# Patient Record
Sex: FEMALE | Race: WHITE | URBAN - METROPOLITAN AREA
[De-identification: names, ages, dates, MRNs, and addresses within clinical notes are randomized per-mention and may not be internally consistent; named-entity substitution may affect disease eponyms.]

---

## 2018-08-06 ENCOUNTER — OFFICE VISIT (OUTPATIENT)
Dept: INTERNAL MEDICINE CLINIC | Age: 18
End: 2018-08-06

## 2018-08-06 ENCOUNTER — HOSPITAL ENCOUNTER (OUTPATIENT)
Dept: LAB | Age: 18
Discharge: HOME | End: 2018-08-06
Payer: COMMERCIAL

## 2018-08-06 VITALS
HEART RATE: 70 BPM | BODY MASS INDEX: 20.28 KG/M2 | DIASTOLIC BLOOD PRESSURE: 62 MMHG | SYSTOLIC BLOOD PRESSURE: 102 MMHG | WEIGHT: 126.2 LBS | TEMPERATURE: 99.3 F | HEIGHT: 66 IN | OXYGEN SATURATION: 99 % | RESPIRATION RATE: 16 BRPM

## 2018-08-06 DIAGNOSIS — Z01.419 WELL FEMALE EXAM WITH ROUTINE GYNECOLOGICAL EXAM: ICD-10-CM

## 2018-08-06 DIAGNOSIS — Z00.00 PHYSICAL EXAM: Primary | ICD-10-CM

## 2018-08-06 PROCEDURE — 88175 CYTOPATH C/V AUTO FLUID REDO: CPT | Performed by: INTERNAL MEDICINE

## 2018-08-06 RX ORDER — NORGESTIMATE AND ETHINYL ESTRADIOL 7DAYSX3 28
KIT ORAL
COMMUNITY
End: 2018-11-20 | Stop reason: SDUPTHER

## 2018-08-06 NOTE — MR AVS SNAPSHOT
[address] [facility] [address] 
[phone] Patient: Farida Number MRN: [id number] [place] Meter Visit Information Date & Time Provider Department Dept. Phone Encounter #  
 8/6/2018  1:00 PM Jimmy Pulido, [address] [facility],[address] Internal Medicine [phone] [id number] Upcoming Health Maintenance Date Due Hepatitis A Peds Age 1-18 (1 of 2 - Standard Series) 8/18/1999 DTaP/Tdap/Td series (1 - Tdap) 8/18/2005 HPV Age 9Y-34Y (1 of 3 - Female 3 Dose Series) 8/18/2009 Influenza Age 5 to Adult 8/1/2018 Allergies as of 8/6/2018  Review Complete On: 8/6/2018 By: Jimmy Pulido MD  
 No Known Allergies Current Immunizations  Never Reviewed No immunizations on file. Not reviewed this visit You Were Diagnosed With   
  
 Codes Comments Physical exam    -  Primary ICD-10-CM: Z00.00 ICD-9-CM: V70.9 Well female exam with routine gynecological exam     ICD-10-CM: I96.430 ICD-9-CM: V72.31 Vitals BP Pulse Temp Resp Height(growth percentile) 102/62 (24 %/ 46 %)* (BP 1 Location: Left arm, BP Patient Position: Sitting) 70 99.3 °F (37.4 °C) (Oral) 16 5' 5.5\" (1.664 m) (68 %, Z= 0.47) Weight(growth percentile) LMP SpO2 BMI Smoking Status 126 lb 3.2 oz (57.2 kg) (46 %, Z= -0.10) 07/16/2018 99% 20.68 kg/m2 (37 %, Z= -0.34) Never Smoker *BP percentiles are based on NHBPEP's 4th Report Growth percentiles are based on CDC 2-20 Years data. BMI and BSA Data Body Mass Index Body Surface Area  
 20.68 kg/m 2 1.63 m 2 Preferred Pharmacy Pharmacy Name Phone [id number] Flynn [phone] Your Updated Medication List  
  
   
This list is accurate as of 8/6/18  2:12 PM.  Always use your most recent med list.  
  
  
  
  
 norgestimate-ethinyl estradiol 0.18/0.215/0.25 mg-35 mcg (28) Tab Commonly known as:  ORTHO TRI-CYCLEN, TRI-SPRINTEC Take  by mouth. We Performed the Following CBC W/O DIFF [39944 CPT(R)] METABOLIC PANEL, COMPREHENSIVE [24790 CPT(R)] PAP IG, [address] HPV ASCU, [id number]) [[id number] Custom] TSH 3RD GENERATION [48872 CPT(R)] Introducing [facility]! Joseph Win introduces Okta patient portal. Now you can access parts of your medical record, email your doctor's office, and request medication refills online. 1. In your internet browser, go to [email or web address] 2. Click on the First Time User? Click Here link in the Sign In box. You will see the New Member Sign Up page. 3. Enter your Okta Access Code exactly as it appears below. You will not need to use this code after youve completed the sign-up process. If you do not sign up before the expiration date, you must request a new code. · Okta Access Code: [email or web address] Expires: 11/4/2018  2:12 PM 
 
4. Enter the last four digits of your Social Security Number (xxxx) and Date of Birth (mm/dd/yyyy) as indicated and click Submit. You will be taken to the next sign-up page. 5. Create a Okta ID. This will be your Okta login ID and cannot be changed, so think of one that is secure and easy to remember. 6. Create a Okta password. You can change your password at any time. 7. Enter your Password Reset Question and Answer. This can be used at a later time if you forget your password. 8. Enter your e-mail address. You will receive e-mail notification when new information is available in [address] 9. Click Sign Up. You can now view and download portions of your medical record. 10. Click the Download Summary menu link to download a portable copy of your medical information. If you have questions, please visit the Frequently Asked Questions section of the Okta website.  Remember, Okta is NOT to be used for urgent needs. For medical emergencies, dial 911. Now available from your iPhone and Android! Please provide this summary of care documentation to your next provider. Your primary care clinician is listed as Craig Gunn. If you have any questions after today's visit, please call [phone].

## 2018-08-06 NOTE — PROGRESS NOTES
Chief Complaint   Patient presents with   [facility] Establish Care     Visit Vitals    /62 (BP 1 Location: Left arm, BP Patient Position: Sitting)    Pulse 70    Temp 99.3 °F (37.4 °C) (Oral)    Resp 16    Ht 5' 5.5\" (1.664 m)    Wt 126 lb 3.2 oz (57.2 kg)    SpO2 99%    BMI 20.68 kg/m2     1. Have you been to the ER, urgent care clinic since your last visit? Hospitalized since your last visit? No    2. Have you seen or consulted any other health care providers outside of the [address] [place] [address] since your last visit? Include any pap smears or colon screening.  No

## 2018-08-06 NOTE — PROGRESS NOTES
Written by Craig Anders, as dictated by Dr. Cal Spurling, MD.    Lynn Collazo is a 21 y.o. female. HPI  The patient comes in today to establish care before she goes back to school at [employer]. She would like a pap smear today but she is not fasting for labs. She denies constipation, heartburn, vaginal discharge,     Patient notes that she has pollen allergies, which were especially bad this year. She can usually take Zyrtec with relief. She denies allergies at this time. She notes that she gets cold sores occasionally, which first started when she was a child. She takes oral contraceptives which were last prescribed by a health care provider at [facility]. She has been taking oral contraceptives since she was 14 because her cycles was irregular. Her cycle is regular while she is on oral contraceptives. She is sexually active and uses condoms, but has not had a pap smear. Patient reports that she has been sexually active since she was 14. When she was younger she received all three HPV vaccines. She does not check her breasts regularly and she denies fhx of breast cancer. Patient notes that she takes a women's multivitamin. She was out in the sun a lot this summer as she was a . No current outpatient prescriptions on file prior to visit. No current facility-administered medications on file prior to visit. Past Medical History:   Diagnosis Date    History of IBS        Past Surgical History:   Procedure Laterality Date    TX RF TONGUE BASE VOL REDUXN      Age 10       Social History     Social History    Marital status: UNKNOWN     Spouse name: N/A    Number of children: N/A    Years of education: N/A     Occupational History    Not on file.      Social History Main Topics    Smoking status: Never Smoker    Smokeless tobacco: Never Used    Alcohol use No    Drug use: No    Sexual activity: Yes     Birth control/ protection: Pill Other Topics Concern    Not on file     Social History Narrative    No narrative on file       Review of Systems   Constitutional: Negative for malaise/fatigue. HENT: Negative for congestion. Eyes: Negative for blurred vision and pain. Respiratory: Negative for cough and shortness of breath. Cardiovascular: Negative for chest pain and palpitations. Gastrointestinal: Negative for abdominal pain and heartburn. Genitourinary: Negative for frequency and urgency. Musculoskeletal: Negative for joint pain and myalgias. Neurological: Negative for dizziness, tingling, sensory change, weakness and headaches. Endo/Heme/Allergies: Positive for environmental allergies. Psychiatric/Behavioral: Negative for depression, memory loss and substance abuse. Visit Vitals    /62 (BP 1 Location: Left arm, BP Patient Position: Sitting)    Pulse 70    Temp 99.3 °F (37.4 °C) (Oral)    Resp 16    Ht 5' 5.5\" (1.664 m)    Wt 126 lb 3.2 oz (57.2 kg)    LMP 07/16/2018    SpO2 99%    BMI 20.68 kg/m2       Physical Exam   Constitutional: She is oriented to person, place, and time. She appears well-developed and well-nourished. No distress. HENT:   Right Ear: External ear normal.   Left Ear: External ear normal.   Eyes: Conjunctivae and EOM are normal. Right eye exhibits no discharge. Left eye exhibits no discharge. Neck: Normal range of motion. Neck supple. Cardiovascular: Normal rate and regular rhythm. Pulses:       Dorsalis pedis pulses are 2+ on the right side, and 2+ on the left side. Pulmonary/Chest: Effort normal and breath sounds normal. She has no wheezes. Breast exam normal , no masses palpable. No axillary lymphadenopathy. Abdominal: Soft. Bowel sounds are normal. There is no tenderness. Genitourinary: Vaginal discharge found. Genitourinary Comments: White discharge   Lymphadenopathy:     She has no cervical adenopathy.    Neurological: She is alert and oriented to person, place, and time. Reflex Scores:       Patellar reflexes are 2+ on the right side and 2+ on the left side. Skin: She is not diaphoretic. Psychiatric: She has a normal mood and affect. Her behavior is normal.   Nursing note and vitals reviewed. ASSESSMENT and PLAN    ICD-10-CM ICD-9-CM    1. Physical exam Z00.00 V70.9 CBC W/O DIFF      METABOLIC PANEL, COMPREHENSIVE      TSH 3RD GENERATION    Complete physical exam done. Basic labs drawn. 2. Well female exam with routine gynecological exam Z01.419 V72.31 PAP IG, RFX HPV ASCU, 34&73,62([id number])    Pap smear performed in office, patient tolerated well. Breast exam done as well. Counseling given on protected sex. This plan was reviewed with the patient and patient agrees. All questions were answered. This scribe documentation was reviewed by me and accurately reflects the examination and decisions made by me.

## 2018-08-07 LAB
ALBUMIN SERPL-MCNC: 4.8 G/DL (ref 3.5–5.5)
ALBUMIN/GLOB SERPL: 1.7 {RATIO} (ref 1.2–2.2)
ALP SERPL-CCNC: 44 IU/L (ref 39–117)
ALT SERPL-CCNC: 12 IU/L (ref 0–32)
AST SERPL-CCNC: 19 IU/L (ref 0–40)
BILIRUB SERPL-MCNC: 0.3 MG/DL (ref 0–1.2)
BUN SERPL-MCNC: 11 MG/DL (ref 6–20)
BUN/CREAT SERPL: 16 (ref 9–23)
CALCIUM SERPL-MCNC: 9.7 MG/DL (ref 8.7–10.2)
CHLORIDE SERPL-SCNC: 101 MMOL/L (ref 96–106)
CO2 SERPL-SCNC: 25 MMOL/L (ref 20–29)
CREAT SERPL-MCNC: 0.68 MG/DL (ref 0.57–1)
ERYTHROCYTE [DISTWIDTH] IN BLOOD BY AUTOMATED COUNT: 12.1 % (ref 12.3–15.4)
GLOBULIN SER CALC-MCNC: 2.8 G/DL (ref 1.5–4.5)
GLUCOSE SERPL-MCNC: 102 MG/DL (ref 65–99)
HCT VFR BLD AUTO: 44.4 % (ref 34–46.6)
HGB BLD-MCNC: 14.7 G/DL (ref 11.1–15.9)
MCH RBC QN AUTO: 31.6 PG (ref 26.6–33)
MCHC RBC AUTO-ENTMCNC: 33.1 G/DL (ref 31.5–35.7)
MCV RBC AUTO: 96 FL (ref 79–97)
PLATELET # BLD AUTO: 325 X10E3/UL (ref 150–379)
POTASSIUM SERPL-SCNC: 4.1 MMOL/L (ref 3.5–5.2)
PROT SERPL-MCNC: 7.6 G/DL (ref 6–8.5)
RBC # BLD AUTO: 4.65 X10E6/UL (ref 3.77–5.28)
SODIUM SERPL-SCNC: 140 MMOL/L (ref 134–144)
TSH SERPL DL<=0.005 MIU/L-ACNC: 1.15 UIU/ML (ref 0.45–4.5)
WBC # BLD AUTO: 7.6 X10E3/UL (ref 3.4–10.8)

## 2018-08-09 NOTE — PROGRESS NOTES
Attempted to contact patient. Mother gave number of [phone]. Called and the mail box is full and will not accept any calls at this time.

## 2018-11-20 ENCOUNTER — OFFICE VISIT (OUTPATIENT)
Dept: PRIMARY CARE CLINIC | Age: 18
End: 2018-11-20

## 2018-11-20 VITALS
BODY MASS INDEX: 20.83 KG/M2 | DIASTOLIC BLOOD PRESSURE: 69 MMHG | SYSTOLIC BLOOD PRESSURE: 130 MMHG | HEART RATE: 72 BPM | HEIGHT: 65 IN | RESPIRATION RATE: 16 BRPM | TEMPERATURE: 98.5 F | OXYGEN SATURATION: 97 % | WEIGHT: 125 LBS

## 2018-11-20 DIAGNOSIS — B00.1 RECURRENT HERPES LABIALIS: ICD-10-CM

## 2018-11-20 DIAGNOSIS — N92.6 MENSTRUAL PERIODS IRREGULAR: ICD-10-CM

## 2018-11-20 DIAGNOSIS — F41.1 GENERALIZED ANXIETY DISORDER: Primary | ICD-10-CM

## 2018-11-20 RX ORDER — BUSPIRONE HYDROCHLORIDE 7.5 MG/1
7.5 TABLET ORAL 2 TIMES DAILY
Qty: 60 TAB | Refills: 0 | Status: SHIPPED | OUTPATIENT
Start: 2018-11-20 | End: 2018-12-14 | Stop reason: SDUPTHER

## 2018-11-20 RX ORDER — NORGESTIMATE AND ETHINYL ESTRADIOL 7DAYSX3 28
1 KIT ORAL DAILY
Qty: 30 TAB | Refills: 3 | Status: SHIPPED | OUTPATIENT
Start: 2018-11-20 | End: 2019-02-14 | Stop reason: SDUPTHER

## 2018-11-20 NOTE — PROGRESS NOTES
Written by Franny Morris, as dictated by Dr. Antonette Jorge MD. 
[employer] Ana Ribera is a 19 y.o. female. HPI The patient presents today c/o panic attacks, which she describes as having very physical manifestations. When she has a panic attack she experiences shaking and crying. Patient notes that she also has trouble shifting her focus during a panic attack. She notes that she has always had anxiety and notes that she has fhx of anxiety. Patient went to a therapist in middle school and a couple years ago she would call her therapist as needed. The patient notes that she is aware of the breathing techniques. She has never seen a psychiatrist or tried medication. Patient notes that she is very concerned about her health, and she becomes especially concerned that she has an STD. Denies sxs such as abnormal odor or discharge. Not sexually active. Patient notes that Valtrex is prescribed by her dentist for cold sores. She has been taking oral contraceptives since she was 10years old. Her last prescription was from [facility] and has just run out. She is interested in transdermal birth control. Her last pap smear was in August 2018. Patient notes that her menstrual cycle is very regular on birth control, but she notes that without birth control her menstrual cycle is very irregular. Patient would like to receive a flu shot today. She is currently studying at [employer]. No current outpatient medications on file prior to visit. No current facility-administered medications on file prior to visit. Past Medical History:  
Diagnosis Date  History of IBS Past Surgical History:  
Procedure Laterality Date  SC RF TONGUE BASE VOL REDUXN Age 10 Social History Socioeconomic History  Marital status: UNKNOWN Spouse name: Not on file  Number of children: Not on file  Years of education: Not on file  Highest education level: Not on file Social Needs  Financial resource strain: Not on file  Food insecurity - worry: Not on file  Food insecurity - inability: Not on file  Transportation needs - medical: Not on file  Transportation needs - non-medical: Not on file Occupational History  Not on file Tobacco Use  Smoking status: Never Smoker  Smokeless tobacco: Never Used Substance and Sexual Activity  Alcohol use: No  
 Drug use: No  
 Sexual activity: Yes Birth control/protection: Pill Other Topics Concern  Not on file Social History Narrative  Not on file Review of Systems Constitutional: Negative for malaise/fatigue and weight loss. Respiratory: Negative for cough and shortness of breath. Cardiovascular: Negative for chest pain and leg swelling. Musculoskeletal: Negative for joint pain and myalgias. Neurological: Negative for dizziness, tingling, sensory change and headaches. Psychiatric/Behavioral: Negative for depression, memory loss and substance abuse. The patient is nervous/anxious. Visit Vitals /69 (BP 1 Location: Right arm, BP Patient Position: Sitting) Pulse 72 Temp 98.5 °F (36.9 °C) (Oral) Resp 16 Ht 5' 5\" (1.651 m) Wt 125 lb (56.7 kg) LMP 11/13/2018 SpO2 97% BMI 20.80 kg/m² Physical Exam  
Constitutional: She is oriented to person, place, and time. She appears well-developed and well-nourished. No distress. HENT:  
Right Ear: External ear normal.  
Left Ear: External ear normal.  
Eyes: Conjunctivae and EOM are normal. Right eye exhibits no discharge. Left eye exhibits no discharge. Neck: Normal range of motion. Neck supple. Cardiovascular: Normal rate and regular rhythm. Pulmonary/Chest: Effort normal and breath sounds normal. She has no wheezes. Abdominal: Soft. Bowel sounds are normal. There is no tenderness. Lymphadenopathy:  
  She has no cervical adenopathy. Neurological: She is alert and oriented to person, place, and time. Skin: She is not diaphoretic. Psychiatric: She has a normal mood and affect. Her behavior is normal.  
Nursing note and vitals reviewed. ASSESSMENT and PLAN 
  ICD-10-CM ICD-9-CM 1. Generalized anxiety disorder F41.1 300.02 busPIRone (BUSPAR) 7.5 mg tablet sent to pharmacy. Buspar 7.5 mg prescribed. Potential side effects were discussed. She should take once daily for 1 week. If she does not experience side effects or relief, she should take BID. If BID does not provide relief after 1 week, she should let me know and I will increase her dosage. Patient should let me know how she is doing in 2-3 weeks. 2. Recurrent herpes labialis B00.1 054. 9 She is taking Valtrex. Discussed that as long as she is not experiencing sxs, she should not worry. 3. Menstrual periods irregular N92.6 626.4 norgestimate-ethinyl estradiol (ORTHO TRI-CYCLEN, TRI-SPRINTEC) 0.18/0.215/0.25 mg-35 mcg (28) tab sent to pharmacy. Norgestimage-ethinyl estradiol prescribed. Influenza vaccine given in office. This plan was reviewed with the patient and patient agrees. All questions were answered. This scribe documentation was reviewed by me and accurately reflects the examination and decisions made by me. This note will not be viewable in [address]

## 2018-12-14 DIAGNOSIS — F41.1 GENERALIZED ANXIETY DISORDER: ICD-10-CM

## 2018-12-14 RX ORDER — BUSPIRONE HYDROCHLORIDE 7.5 MG/1
7.5 TABLET ORAL 2 TIMES DAILY
Qty: 60 TAB | Refills: 0 | Status: SHIPPED | OUTPATIENT
Start: 2018-12-14 | End: 2018-12-17 | Stop reason: SDUPTHER

## 2018-12-17 DIAGNOSIS — F41.1 GENERALIZED ANXIETY DISORDER: ICD-10-CM

## 2018-12-17 RX ORDER — BUSPIRONE HYDROCHLORIDE 7.5 MG/1
7.5 TABLET ORAL 2 TIMES DAILY
Qty: 60 TAB | Refills: 0 | Status: SHIPPED | OUTPATIENT
Start: 2018-12-17 | End: 2019-01-16

## 2019-02-14 DIAGNOSIS — N92.6 MENSTRUAL PERIODS IRREGULAR: ICD-10-CM

## 2019-02-14 RX ORDER — NORGESTIMATE AND ETHINYL ESTRADIOL 7DAYSX3 28
1 KIT ORAL DAILY
Qty: 30 TAB | Refills: 3 | Status: SHIPPED | OUTPATIENT
Start: 2019-02-14 | End: 2019-04-02 | Stop reason: ALTCHOICE

## 2019-02-19 ENCOUNTER — OFFICE VISIT (OUTPATIENT)
Dept: PRIMARY CARE CLINIC | Age: 19
End: 2019-02-19

## 2019-02-19 VITALS
SYSTOLIC BLOOD PRESSURE: 121 MMHG | HEIGHT: 65 IN | WEIGHT: 123.4 LBS | BODY MASS INDEX: 20.56 KG/M2 | RESPIRATION RATE: 16 BRPM | DIASTOLIC BLOOD PRESSURE: 82 MMHG | HEART RATE: 68 BPM | TEMPERATURE: 98.1 F | OXYGEN SATURATION: 100 %

## 2019-02-19 DIAGNOSIS — F41.9 ANXIETY: Primary | ICD-10-CM

## 2019-02-19 DIAGNOSIS — F41.0 PANIC DISORDER: ICD-10-CM

## 2019-02-19 RX ORDER — SERTRALINE HYDROCHLORIDE 50 MG/1
50 TABLET, FILM COATED ORAL DAILY
Qty: 30 TAB | Refills: 1 | Status: SHIPPED | OUTPATIENT
Start: 2019-02-19 | End: 2019-03-15 | Stop reason: SDUPTHER

## 2019-02-19 RX ORDER — VALACYCLOVIR HYDROCHLORIDE 500 MG/1
TABLET, FILM COATED ORAL DAILY
COMMUNITY
End: 2019-04-26 | Stop reason: SDUPTHER

## 2019-02-19 RX ORDER — PROPRANOLOL HYDROCHLORIDE 10 MG/1
10 TABLET ORAL AS NEEDED
Qty: 30 TAB | Refills: 1 | Status: SHIPPED | OUTPATIENT
Start: 2019-02-19 | End: 2019-03-15 | Stop reason: SDUPTHER

## 2019-02-19 NOTE — PATIENT INSTRUCTIONS
I've explained to her that drugs of the SSRI class can have side effects such as weight gain, sexual dysfunction, insomnia, headache, nausea. These medications are generally effective at alleviating symptoms of anxiety and/or depression. Let me know if significant side effects do occur. Sertraline (By mouth)   Sertraline (SER-tra-dena)  Treats depression, obsessive-compulsive disorder (OCD), posttraumatic stress disorder (PTSD), premenstrual dysphoric disorder (PMDD), social anxiety disorder, and panic disorder. This medicine is an SSRI. Brand Name(s): Zoloft   There may be other brand names for this medicine. When This Medicine Should Not Be Used: This medicine is not right for everyone. Do not use it if you had an allergic reaction to sertraline. How to Use This Medicine:   Liquid, Tablet  · Take your medicine as directed. Your dose may need to be changed several times to find what works best for you. You may need to take it for a few weeks or months before you feel better. · Oral liquid: Use the dropper provided to remove the medicine and mix it with 1/2 cup (4 ounces) of water, ginger ale, lemon-lime soda, lemonade, or orange juice. Drink the mixture right away. It is normal for it to look a bit hazy. · This medicine should come with a Medication Guide. Ask your pharmacist for a copy if you do not have one. · Missed dose: Take a dose as soon as you remember. If it is almost time for your next dose, wait until then and take a regular dose. Do not take extra medicine to make up for a missed dose. · Store the medicine in a closed container at room temperature, away from heat, moisture, and direct light. Drugs and Foods to Avoid:   Ask your doctor or pharmacist before using any other medicine, including over-the-counter medicines, vitamins, and herbal products. · Do not use this medicine together with pimozide. Do not use this medicine and an MAO inhibitor (MAOI) within 14 days of each other.  Do not use the oral liquid form of sertraline if you are also using disulfiram.  · Some medicines can affect how sertraline works. Tell your doctor if you are using the following:   ¨ Buspirone, cimetidine, cisapride, diazepam, digitoxin, fentanyl, flecainide, lithium, phenytoin, propafenone, René's wort, tramadol, tryptophan supplements, or valproate  ¨ A blood thinner (such as warfarin), a diuretic (water pill), an NSAID pain or arthritis medicine (such as aspirin, diclofenac, ibuprofen), a tricyclic antidepressant, a triptan medicine for migraine headaches  · Do not drink alcohol while you are using this medicine. Warnings While Using This Medicine:   · Tell your doctor if you are pregnant or breastfeeding, or if you have liver disease, bleeding problems, glaucoma, heart disease, or a seizure disorder. · For some children, teenagers, and young adults, this medicine may increase mental or emotional problems. This may lead to thoughts of suicide and violence. Talk with your doctor right away if you have any thoughts or behavior changes that concern you. Tell your doctor if you or anyone in your family has a history of bipolar disorder or suicide attempts. · This medicine may cause the following problems:   ¨ Serotonin syndrome (when taken with certain medicines)  ¨ Low sodium levels (more common in elderly patients and those who take diuretics or become dehydrated)  · Tell your doctor if you are sensitive to latex, because the oral liquid comes with a latex rubber dropper. · This medicine may make you dizzy or drowsy. Do not drive or do anything that could be dangerous until you know how this medicine affects you. · Do not stop using this medicine suddenly. Your doctor will need to slowly decrease your dose before you stop it completely. · Your doctor will check your progress and the effects of this medicine at regular visits. Keep all appointments. · Keep all medicine out of the reach of children.  Never share your medicine with anyone. Possible Side Effects While Using This Medicine:   Call your doctor right away if you notice any of these side effects:  · Allergic reaction: Itching or hives, swelling in your face or hands, swelling or tingling in your mouth or throat, chest tightness, trouble breathing  · Anxiety, restlessness, fast heartbeat, fever, sweating, muscle spasms, twitching, nausea, vomiting, diarrhea, seeing or hearing things that are not there  · Blistering, peeling, or red skin rash  · Confusion, weakness, and muscle twitching  · Eye pain, vision changes, seeing halos around lights  · Feeling more excited or energetic than usual  · Thoughts of hurting yourself or others, unusual behavior  · Unusual bleeding or bruising  If you notice these less serious side effects, talk with your doctor:   · Dry mouth  · Loss of appetite, weight loss  · Mild diarrhea, constipation, nausea, vomiting  · Sexual problems  · Sleepiness, or trouble sleeping  If you notice other side effects that you think are caused by this medicine, tell your doctor. Call your doctor for medical advice about side effects. You may report side effects to FDA at 6-479-FDA-3035  © 2017 [employer] Information is for End User's use only and may not be sold, redistributed or otherwise used for commercial purposes. The above information is an  only. It is not intended as medical advice for individual conditions or treatments. Talk to your doctor, nurse or pharmacist before following any medical regimen to see if it is safe and effective for you.

## 2019-02-20 NOTE — PROGRESS NOTES
[facility]   [address], [address] [facility]  P: [phone]  F: [phone]      Chief Complaint   Patient presents with    Follow-up     with discussion about buspirone       SUBJECTIVE   Christel Hassan is a 19 y.o. female who presents to clinic for Follow-up (with discussion about buspirone). HPI:    The patient presents with her mom for anxiety follow-up. She stopped taking buspar 7.5mg BID because it was not helping her anxiety. She requests to start a new medication today. Her sister takes Zoloft and thinks it may work well for her also. She also would try a beta blocker for anxiety inducing situations. She is having panic attacks several times weekly with symptoms including fast heartbeat, crying, and feeling that she can't catch her breathe. She is working on mindfulness which she learned from her previous therapist. She plans to reach out to her therapist to restart sessions as she found therapy was helpful. There are no active problems to display for this patient.     Past Medical History:   Diagnosis Date    History of IBS      Past Surgical History:   Procedure Laterality Date    TX RF TONGUE BASE VOL REDUXN      Age 10     Social History     Socioeconomic History    Marital status: UNKNOWN     Spouse name: Not on file    Number of children: Not on file    Years of education: Not on file    Highest education level: Not on file   Social Needs    Financial resource strain: Not on file    Food insecurity - worry: Not on file    Food insecurity - inability: Not on file   [employer] needs - medical: Not on file   [employer] needs - non-medical: Not on file   Occupational History    Not on file   Tobacco Use    Smoking status: Never Smoker    Smokeless tobacco: Never Used   Substance and Sexual Activity    Alcohol use: No    Drug use: No    Sexual activity: Yes     Birth control/protection: Pill   Other Topics Concern    Not on file   Social History Narrative    Not on file     History reviewed. No pertinent family history. No Known Allergies    Current Outpatient Medications   Medication Sig Dispense Refill    valACYclovir (VALTREX) 500 mg tablet Take  by mouth two (2) times a day.  propranolol (INDERAL) 10 mg tablet Take 1 Tab by mouth as needed. 30 Tab 1    sertraline (ZOLOFT) 50 mg tablet Take 1 Tab by mouth daily. 30 Tab 1    norgestimate-ethinyl estradiol (ORTHO TRI-CYCLEN, TRI-SPRINTEC) 0.18/0.215/0.25 mg-35 mcg (28) tab Take 1 Tab by mouth daily for 90 days. 30 Tab 3           The medications were reviewed and updated in the medical record. The past medical history, past surgical history, and family history were reviewed and updated in the medical record. REVIEW OF SYSTEMS   Review of Systems   Constitutional: Negative for malaise/fatigue. HENT: Negative for congestion. Eyes: Negative for blurred vision and pain. Respiratory: Negative for cough and shortness of breath. Cardiovascular: Negative for chest pain and palpitations. Gastrointestinal: Negative for abdominal pain and heartburn. Genitourinary: Negative for frequency and urgency. Musculoskeletal: Negative for joint pain and myalgias. Neurological: Negative for dizziness, tingling, sensory change, weakness and headaches. Psychiatric/Behavioral: Negative for depression, memory loss and substance abuse. The patient is nervous/anxious. PHYSICAL EXAM     Visit Vitals  /82 (BP 1 Location: Right arm, BP Patient Position: Sitting)   Pulse 68   Temp 98.1 °F (36.7 °C) (Oral)   Resp 16   Ht 5' 5\" (1.651 m)   Wt 123 lb 6.4 oz (56 kg)   LMP 02/05/2019   SpO2 100%   BMI 20.53 kg/m²       Physical Exam   Constitutional: She is oriented to person, place, and time and well-developed, well-nourished, and in no distress. HENT:   Head: Normocephalic and atraumatic.    Right Ear: External ear normal.   Left Ear: External ear normal.   Cardiovascular: Normal rate, regular rhythm and normal heart sounds. Pulmonary/Chest: Effort normal and breath sounds normal.   Musculoskeletal: Normal range of motion. She exhibits no edema. Neurological: She is alert and oriented to person, place, and time. Gait normal.   Skin: Skin is warm and dry. Psychiatric: Affect and judgment normal. Her mood appears anxious. She does not exhibit a depressed mood. She expresses no homicidal and no suicidal ideation. Nursing note and vitals reviewed. ASSESSMENT/ PLAN   Diagnoses and all orders for this visit:    1. Anxiety  -     propranolol (INDERAL) 10 mg tablet; Take 1 Tab by mouth as needed. Try at home first as may make her drowsy or dizzy. -     sertraline (ZOLOFT) 50 mg tablet; Take 1 Tab by mouth daily.  -I've explained to her that drugs of the SSRI class can have side effects such as weight gain, sexual dysfunction, insomnia, headache, nausea. These medications are generally effective at alleviating symptoms of anxiety and/or depression. Let me know if significant side effects do occur.  - Denies needing resources for a counselor today. She will reach out to her previous therapist.   2. Panic disorder  -     propranolol (INDERAL) 10 mg tablet; Take 1 Tab by mouth as needed. Follow-up Disposition:  Return in about 1 month (around 3/19/2019), or Anxiety, new medication . Disclaimer:  Advised patient to call back or return to office if symptoms worsen/change/persist.  Discussed expected course/resolution/complications of diagnosis in detail with patient.     Medication risks/benefits/alternatives discussed with patient. Patient was given an after visit summary which includes diagnoses, current medications, & vitals.      Discussed patient instructions and advised to read to all patient instructions regarding care.      Patient expressed understanding with the diagnosis and plan. This note will not be viewable in [address]         Namrata Mittal NP  2/19/2019        (This document has been electronically signed)

## 2019-02-21 RX ORDER — BUSPIRONE HYDROCHLORIDE 7.5 MG/1
TABLET ORAL
Qty: 60 TAB | Refills: 0 | Status: SHIPPED | OUTPATIENT
Start: 2019-02-21 | End: 2019-04-02 | Stop reason: ALTCHOICE

## 2019-03-15 DIAGNOSIS — F41.9 ANXIETY: ICD-10-CM

## 2019-03-15 DIAGNOSIS — F41.0 PANIC DISORDER: ICD-10-CM

## 2019-03-15 RX ORDER — SERTRALINE HYDROCHLORIDE 50 MG/1
50 TABLET, FILM COATED ORAL DAILY
Qty: 30 TAB | Refills: 0 | Status: SHIPPED | OUTPATIENT
Start: 2019-03-15 | End: 2019-03-28 | Stop reason: SDUPTHER

## 2019-03-15 RX ORDER — PROPRANOLOL HYDROCHLORIDE 10 MG/1
10 TABLET ORAL AS NEEDED
Qty: 30 TAB | Refills: 0 | Status: SHIPPED | OUTPATIENT
Start: 2019-03-15 | End: 2019-03-28 | Stop reason: SDUPTHER

## 2019-03-28 DIAGNOSIS — F41.0 PANIC DISORDER: ICD-10-CM

## 2019-03-28 DIAGNOSIS — F41.9 ANXIETY: ICD-10-CM

## 2019-03-28 RX ORDER — PROPRANOLOL HYDROCHLORIDE 10 MG/1
10 TABLET ORAL AS NEEDED
Qty: 30 TAB | Refills: 0 | Status: SHIPPED | OUTPATIENT
Start: 2019-03-28 | End: 2019-06-08 | Stop reason: SDUPTHER

## 2019-03-28 RX ORDER — SERTRALINE HYDROCHLORIDE 50 MG/1
50 TABLET, FILM COATED ORAL DAILY
Qty: 30 TAB | Refills: 0 | Status: SHIPPED | OUTPATIENT
Start: 2019-03-28 | End: 2019-04-02 | Stop reason: SDUPTHER

## 2019-04-02 ENCOUNTER — OFFICE VISIT (OUTPATIENT)
Dept: PRIMARY CARE CLINIC | Age: 19
End: 2019-04-02

## 2019-04-02 VITALS
DIASTOLIC BLOOD PRESSURE: 59 MMHG | TEMPERATURE: 97.9 F | HEIGHT: 65 IN | HEART RATE: 60 BPM | RESPIRATION RATE: 17 BRPM | SYSTOLIC BLOOD PRESSURE: 107 MMHG | WEIGHT: 128.8 LBS | OXYGEN SATURATION: 98 % | BODY MASS INDEX: 21.46 KG/M2

## 2019-04-02 DIAGNOSIS — F41.9 ANXIETY AND DEPRESSION: Primary | ICD-10-CM

## 2019-04-02 DIAGNOSIS — F32.A ANXIETY AND DEPRESSION: Primary | ICD-10-CM

## 2019-04-02 DIAGNOSIS — N92.6 MENSTRUAL PERIODS IRREGULAR: ICD-10-CM

## 2019-04-02 RX ORDER — SERTRALINE HYDROCHLORIDE 50 MG/1
50 TABLET, FILM COATED ORAL DAILY
Qty: 90 TAB | Refills: 0 | Status: SHIPPED | OUTPATIENT
Start: 2019-04-02 | End: 2019-04-19 | Stop reason: SDUPTHER

## 2019-04-02 RX ORDER — NORGESTIMATE AND ETHINYL ESTRADIOL 7DAYSX3 LO
1 KIT ORAL DAILY
Qty: 30 TAB | Refills: 2 | Status: SHIPPED | OUTPATIENT
Start: 2019-04-02 | End: 2019-06-18 | Stop reason: SDUPTHER

## 2019-04-02 NOTE — PROGRESS NOTES
Written by Abdoulaye Riggs, as dictated by Dr. Av Sotelo MD. 
 
[address] [facility] Sid is a 19 y.o. female. HPI The patient presents today for a medication evaluation. Compliant on Zoloft 50 mg and doing very well. She does not believe her dosage needs to be increased. Pt has stopped taking Buspar. Her mother is taking Zoloft and her sister takes Lexapro. She notes that she has tried propranolol 10 mg a few times, but notes that she only had to take it a few time. Pt reports that she does not notice a significant difference in her anxiety after taking propranolol. Pt requests a prescription for a different oral contraceptive as her pharmacy does not have ortho tri-cyclen. She has not been taking any oral contraceptives. The pt had been taking ortho tri-cyclen since she was 10years old. 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 Current Outpatient Medications on File Prior to Visit Medication Sig Dispense Refill  propranolol (INDERAL) 10 mg tablet Take 1 Tab by mouth as needed (Anxiety) for up to 30 days. Indications: a feeling of restlessness with inability to sit still 30 Tab 0  
 valACYclovir (VALTREX) 500 mg tablet Take  by mouth daily. No current facility-administered medications on file prior to visit. Past Medical History:  
Diagnosis Date  History of IBS Past Surgical History:  
Procedure Laterality Date  CO RF TONGUE BASE VOL REDUXN Age 10 Social History Socioeconomic History  Marital status: UNKNOWN Spouse name: Not on file  Number of children: Not on file  Years of education: Not on file  Highest education level: Not on file Occupational History  Not on file Social Needs  Financial resource strain: Not on file  Food insecurity:  
  Worry: Not on file Inability: Not on file  Transportation needs:  
  Medical: Not on file Non-medical: Not on file Tobacco Use  
  Smoking status: Never Smoker  Smokeless tobacco: Never Used Substance and Sexual Activity  Alcohol use: No  
 Drug use: No  
 Sexual activity: Yes Birth control/protection: Pill Lifestyle  Physical activity:  
  Days per week: Not on file Minutes per session: Not on file  Stress: Not on file Relationships  Social connections:  
  Talks on phone: Not on file Gets together: Not on file Attends Shinto service: Not on file Active member of club or organization: Not on file Attends meetings of clubs or organizations: Not on file Relationship status: Not on file  Intimate partner violence:  
  Fear of current or ex partner: Not on file Emotionally abused: Not on file Physically abused: Not on file Forced sexual activity: Not on file Other Topics Concern  Not on file Social History Narrative  Not on file Review of Systems Respiratory: Negative for cough and shortness of breath. Musculoskeletal: Negative for joint pain and myalgias. Neurological: Negative for dizziness, tingling, sensory change, weakness and headaches. Psychiatric/Behavioral: Positive for depression (improved on zoloft). Negative for memory loss and substance abuse. The patient is nervous/anxious (improved on zoloft). Visit Vitals /59 (BP 1 Location: Right arm, BP Patient Position: Sitting) Pulse 60 Temp 97.9 °F (36.6 °C) (Oral) Resp 17 Ht 5' 5\" (1.651 m) Wt 128 lb 12.8 oz (58.4 kg) LMP 03/12/2019 (Approximate) SpO2 98% BMI 21.43 kg/m² Physical Exam  
Constitutional: She is oriented to person, place, and time. She appears well-developed and well-nourished. No distress. HENT:  
Right Ear: External ear normal.  
Left Ear: External ear normal.  
Eyes: Conjunctivae and EOM are normal. Right eye exhibits no discharge. Left eye exhibits no discharge. Neck: Normal range of motion. Neck supple. Cardiovascular: Normal rate and regular rhythm. Pulmonary/Chest: Effort normal and breath sounds normal. She has no wheezes. Abdominal: Soft. Bowel sounds are normal. There is no tenderness. Lymphadenopathy:  
  She has no cervical adenopathy. Neurological: She is alert and oriented to person, place, and time. Skin: She is not diaphoretic. Psychiatric: She has a normal mood and affect. Her behavior is normal.  
Nursing note and vitals reviewed. ASSESSMENT and PLAN 
  ICD-10-CM ICD-9-CM 1. Anxiety and depression F41.9 300.00 sertraline (ZOLOFT) 50 mg tablet sent to pharmacy. F32.9 311 Zoloft 50 mg refilled. 2. Menstrual periods irregular N92.6 626.4 norgestimate-ethinyl estradiol (ORTHO TRI-CYCLEN LO) 0.18/0.215/0.25 mg-25 mcg tab sent to pharmacy. Ortho tri-cyclen lo prescribed. Potential side effects were discussed. This plan was reviewed with the patient and patient agrees. All questions were answered. This scribe documentation was reviewed by me and accurately reflects the examination and decisions made by me. This note will not be viewable in [address]

## 2019-04-02 NOTE — PROGRESS NOTES
Demi Coronel is a 19 y.o. female Room 4 Chief Complaint Patient presents with  Medication Evaluation Sertraline Refill request and new birth control prescription request  
 
1. Have you been to the ER, urgent care clinic since your last visit? Hospitalized since your last visit? no 
2. Have you seen or consulted any other health care providers outside of the [address] [place] [address] since your last visit? Include any pap smears or colon screening.  no

## 2019-04-19 DIAGNOSIS — F32.A ANXIETY AND DEPRESSION: ICD-10-CM

## 2019-04-19 DIAGNOSIS — F41.9 ANXIETY AND DEPRESSION: ICD-10-CM

## 2019-04-19 RX ORDER — SERTRALINE HYDROCHLORIDE 50 MG/1
TABLET, FILM COATED ORAL
Qty: 90 TAB | Refills: 0 | Status: SHIPPED | OUTPATIENT
Start: 2019-04-19 | End: 2019-07-19 | Stop reason: SDUPTHER

## 2019-04-25 ENCOUNTER — TELEPHONE (OUTPATIENT)
Dept: PRIMARY CARE CLINIC | Age: 19
End: 2019-04-25

## 2019-04-25 DIAGNOSIS — Z86.19 HISTORY OF HERPES SIMPLEX INFECTION: Primary | ICD-10-CM

## 2019-04-25 NOTE — TELEPHONE ENCOUNTER
Pt. Would like Dr. Gilles Funez to fill out her Travel physician approval form. PT. Would also like to know if we can prescribe Valtrex usually from her dentist but she would like Dr. Gilles Funez to refill. Leave a detailed message on her VM if she is unable to  @ [phone].

## 2019-04-26 RX ORDER — VALACYCLOVIR HYDROCHLORIDE 500 MG/1
500 TABLET, FILM COATED ORAL DAILY
Qty: 30 TAB | Refills: 1 | Status: SHIPPED | OUTPATIENT
Start: 2019-04-26 | End: 2019-05-13 | Stop reason: SDUPTHER

## 2019-04-26 NOTE — TELEPHONE ENCOUNTER
Attempted to call patient and discuss form specifics, however, voice mail is full.  Patient's number is [phone]

## 2019-04-26 NOTE — TELEPHONE ENCOUNTER
Re: Valtrex- Sent to Dr. David Mason as a refill request    Re: Travel Form- Message to Dr. David Mason asking if she wants an appointment or patient to drop off      Pending response from Dr. David Mason for patient call back

## 2019-04-26 NOTE — TELEPHONE ENCOUNTER
Called and spoke with patient after confirming patient identifiers and clarified travelers form. Patient is going on a trip to [facility] with her class from [employer] and needs a Health Clearance stating she has had a health physical in the last year, all of her immunizations, etc. Review of her record indicates we have none of her immunization history from her pediatricians office. Advised patient that she will need to get this information from them and then send it to us before we can proceed. She states she will do so, and sign up for My Chart to communicate with the office that way. End of encounter.

## 2019-04-29 ENCOUNTER — PATIENT MESSAGE (OUTPATIENT)
Dept: PRIMARY CARE CLINIC | Age: 19
End: 2019-04-29

## 2019-04-29 NOTE — TELEPHONE ENCOUNTER
From: Bear Rodgers  To: Willard Hutton MD  Sent: 4/29/2019 11:54 AM EDT  Subject: Update Medical Information    Here is the form that I need filled out for my study abroad this summer. It is for three weeks in the [place] of [place]. It is due by May 1, which is this Wednesday.      Thank you,  Iso

## 2019-05-01 ENCOUNTER — TELEPHONE (OUTPATIENT)
Dept: PRIMARY CARE CLINIC | Age: 19
End: 2019-05-01

## 2019-05-10 DIAGNOSIS — F41.9 ANXIETY: ICD-10-CM

## 2019-05-10 DIAGNOSIS — F41.0 PANIC DISORDER: ICD-10-CM

## 2019-05-10 RX ORDER — PROPRANOLOL HYDROCHLORIDE 10 MG/1
TABLET ORAL
Qty: 30 TAB | Refills: 0 | Status: SHIPPED | OUTPATIENT
Start: 2019-05-10 | End: 2019-12-30 | Stop reason: ALTCHOICE

## 2019-05-13 DIAGNOSIS — Z86.19 HISTORY OF HERPES SIMPLEX INFECTION: ICD-10-CM

## 2019-05-13 RX ORDER — VALACYCLOVIR HYDROCHLORIDE 500 MG/1
500 TABLET, FILM COATED ORAL DAILY
Qty: 30 TAB | Refills: 1 | Status: SHIPPED | OUTPATIENT
Start: 2019-05-13 | End: 2019-07-19 | Stop reason: SDUPTHER

## 2019-06-08 DIAGNOSIS — F41.0 PANIC DISORDER: ICD-10-CM

## 2019-06-08 DIAGNOSIS — F41.9 ANXIETY: ICD-10-CM

## 2019-06-08 RX ORDER — PROPRANOLOL HYDROCHLORIDE 10 MG/1
TABLET ORAL
Qty: 30 TAB | Refills: 0 | Status: SHIPPED | OUTPATIENT
Start: 2019-06-08 | End: 2019-12-30 | Stop reason: ALTCHOICE

## 2019-06-18 DIAGNOSIS — N92.6 MENSTRUAL PERIODS IRREGULAR: ICD-10-CM

## 2019-06-18 RX ORDER — NORGESTIMATE AND ETHINYL ESTRADIOL 7DAYSX3 LO
1 KIT ORAL DAILY
Qty: 30 TAB | Refills: 2 | Status: SHIPPED | OUTPATIENT
Start: 2019-06-18 | End: 2019-09-16

## 2019-06-19 DIAGNOSIS — N92.6 MENSTRUAL PERIODS IRREGULAR: ICD-10-CM

## 2019-06-19 RX ORDER — NORGESTIMATE AND ETHINYL ESTRADIOL 7DAYSX3 LO
KIT ORAL
Qty: 84 TAB | Refills: 0 | Status: SHIPPED | OUTPATIENT
Start: 2019-06-19 | End: 2019-09-08 | Stop reason: SDUPTHER

## 2019-07-19 DIAGNOSIS — Z86.19 HISTORY OF HERPES SIMPLEX INFECTION: ICD-10-CM

## 2019-07-19 DIAGNOSIS — F41.9 ANXIETY AND DEPRESSION: ICD-10-CM

## 2019-07-19 DIAGNOSIS — F32.A ANXIETY AND DEPRESSION: ICD-10-CM

## 2019-07-19 RX ORDER — SERTRALINE HYDROCHLORIDE 50 MG/1
50 TABLET, FILM COATED ORAL DAILY
Qty: 90 TAB | Refills: 0 | Status: SHIPPED | OUTPATIENT
Start: 2019-07-19 | End: 2019-10-17

## 2019-07-19 RX ORDER — VALACYCLOVIR HYDROCHLORIDE 500 MG/1
500 TABLET, FILM COATED ORAL DAILY
Qty: 30 TAB | Refills: 1 | Status: SHIPPED | OUTPATIENT
Start: 2019-07-19 | End: 2019-10-29 | Stop reason: SDUPTHER

## 2019-09-08 DIAGNOSIS — N92.6 MENSTRUAL PERIODS IRREGULAR: ICD-10-CM

## 2019-09-08 RX ORDER — NORGESTIMATE AND ETHINYL ESTRADIOL 7DAYSX3 LO
KIT ORAL
Qty: 84 TAB | Refills: 0 | Status: SHIPPED | OUTPATIENT
Start: 2019-09-08 | End: 2019-12-10 | Stop reason: SDUPTHER

## 2019-10-29 DIAGNOSIS — Z86.19 HISTORY OF HERPES SIMPLEX INFECTION: ICD-10-CM

## 2019-10-30 RX ORDER — VALACYCLOVIR HYDROCHLORIDE 500 MG/1
500 TABLET, FILM COATED ORAL DAILY
Qty: 30 TAB | Refills: 1 | Status: SHIPPED | OUTPATIENT
Start: 2019-10-30 | End: 2019-12-30 | Stop reason: SDUPTHER

## 2019-12-10 DIAGNOSIS — N92.6 MENSTRUAL PERIODS IRREGULAR: ICD-10-CM

## 2019-12-10 RX ORDER — NORGESTIMATE AND ETHINYL ESTRADIOL 7DAYSX3 LO
1 KIT ORAL DAILY
Qty: 84 TAB | Refills: 0 | Status: SHIPPED | OUTPATIENT
Start: 2019-12-10 | End: 2019-12-30 | Stop reason: SDUPTHER

## 2019-12-30 ENCOUNTER — OFFICE VISIT (OUTPATIENT)
Dept: PRIMARY CARE CLINIC | Age: 19
End: 2019-12-30

## 2019-12-30 VITALS
DIASTOLIC BLOOD PRESSURE: 67 MMHG | HEART RATE: 63 BPM | HEIGHT: 65 IN | SYSTOLIC BLOOD PRESSURE: 105 MMHG | RESPIRATION RATE: 14 BRPM | WEIGHT: 131 LBS | BODY MASS INDEX: 21.83 KG/M2 | TEMPERATURE: 98.1 F | OXYGEN SATURATION: 99 %

## 2019-12-30 DIAGNOSIS — F32.A ANXIETY AND DEPRESSION: Primary | ICD-10-CM

## 2019-12-30 DIAGNOSIS — Z86.19 HISTORY OF HERPES SIMPLEX INFECTION: ICD-10-CM

## 2019-12-30 DIAGNOSIS — F41.9 ANXIETY AND DEPRESSION: Primary | ICD-10-CM

## 2019-12-30 DIAGNOSIS — N92.6 MENSTRUAL PERIODS IRREGULAR: ICD-10-CM

## 2019-12-30 RX ORDER — NORGESTIMATE AND ETHINYL ESTRADIOL 7DAYSX3 LO
1 KIT ORAL DAILY
Qty: 84 TAB | Refills: 3 | Status: SHIPPED | OUTPATIENT
Start: 2019-12-30 | End: 2020-03-29

## 2019-12-30 RX ORDER — SERTRALINE HYDROCHLORIDE 50 MG/1
50 TABLET, FILM COATED ORAL DAILY
Qty: 90 TAB | Refills: 1 | Status: SHIPPED | OUTPATIENT
Start: 2019-12-30 | End: 2020-07-16 | Stop reason: SDUPTHER

## 2019-12-30 RX ORDER — SERTRALINE HYDROCHLORIDE 50 MG/1
TABLET, FILM COATED ORAL DAILY
COMMUNITY
End: 2019-12-30 | Stop reason: SDUPTHER

## 2019-12-30 RX ORDER — VALACYCLOVIR HYDROCHLORIDE 500 MG/1
500 TABLET, FILM COATED ORAL DAILY
Qty: 30 TAB | Refills: 3 | Status: SHIPPED | OUTPATIENT
Start: 2019-12-30 | End: 2020-02-03 | Stop reason: SDUPTHER

## 2019-12-30 NOTE — PROGRESS NOTES
Chief Complaint   Patient presents with    Depression     f/u         1. Have you been to the ER, urgent care clinic since your last visit? Hospitalized since your last visit? no    2. Have you seen or consulted any other health care providers outside of the [address] [place] [address] since your last visit? Include any pap smears or colon screening.   no

## 2019-12-30 NOTE — PROGRESS NOTES
Written by Leopoldo Pier, as dictated by Dr. Ginette Mckeon MD.    Grace Choe is a 22 y.o. female. HPI  The patient presents today for follow-up on depression. She has been doing well since her last visit, and reports Zoloft 50 mg is working well to control her anxiety. She has not needed to use Propranolol 10 mg at all for social anxiety, as it has been well managed with Zoloft. Denies crying spells recently, palpitations, or chest tightness. She take Valtrex 500 mg preventatively once daily and requests a refill. She tries to work out when she has time. Her cycle is normal, and is taking Tri-Lo-Marizia, and requests a refill. Her last pap smear was in 2018, which was normal.    She usually does not get the flu shot, and is hesitant to get it today as she has not gotten sick. She does recall getting the Tdap recently, but does not recall when. She will be going back to school at [employer] on 01/04/20 to finish her 3rd year of College. Current Outpatient Medications on File Prior to Visit   Medication Sig Dispense Refill    sertraline (ZOLOFT) 50 mg tablet Take  by mouth daily.  valACYclovir (VALTREX) 500 mg tablet Take 1 Tab by mouth daily. 30 Tab 1    [DISCONTINUED] norgestimate-ethinyl estradiol (TRI-LO-ASTRID) 0.18/0.215/0.25 mg-25 mcg tab Take 1 Tab by mouth daily. 84 Tab 0    [DISCONTINUED] propranolol (INDERAL) 10 mg tablet PLEASE SEE ATTACHED FOR DETAILED DIRECTIONS 30 Tab 0    [DISCONTINUED] propranolol (INDERAL) 10 mg tablet TAKE 1 TABLET BY MOUTH AS NEEDED FOR ANXIETY 30 Tab 0     No current facility-administered medications on file prior to visit.         No Known Allergies    Past Medical History:   Diagnosis Date    History of IBS        Past Surgical History:   Procedure Laterality Date    [place] RF TONGUE BASE VOL REDUXN      Age 10       Family History   Problem Relation Age of Onset    No Known Problems Mother     No Known Problems Father     No Known Problems Sister     No Known Problems Brother         3 half brothers       Social History     Socioeconomic History    Marital status: UNKNOWN     Spouse name: Not on file    Number of children: Not on file    Years of education: Not on file    Highest education level: Not on file   Occupational History    Not on file   Social Needs    Financial resource strain: Not on file    Food insecurity:     Worry: Not on file     Inability: Not on file    Transportation needs:     Medical: Not on file     Non-medical: Not on file   Tobacco Use    Smoking status: Never Smoker    Smokeless tobacco: Never Used   Substance and Sexual Activity    Alcohol use: Yes     Alcohol/week: 3.0 standard drinks     Types: 1 Glasses of wine, 1 Cans of beer, 1 Shots of liquor per week     Comment: 2 x weekly    Drug use: No    Sexual activity: Yes     Partners: Male     Birth control/protection: Pill   Lifestyle    Physical activity:     Days per week: Not on file     Minutes per session: Not on file    Stress: Not on file   Relationships    Social connections:     Talks on phone: Not on file     Gets together: Not on file     Attends Bahai service: Not on file     Active member of club or organization: Not on file     Attends meetings of clubs or organizations: Not on file     Relationship status: Not on file    Intimate partner violence:     Fear of current or ex partner: Not on file     Emotionally abused: Not on file     Physically abused: Not on file     Forced sexual activity: Not on file   Other Topics Concern    Not on file   Social History Narrative    Not on file       Review of Systems   Constitutional: Negative for malaise/fatigue and weight loss. Respiratory: Negative for cough and shortness of breath. Musculoskeletal: Negative for joint pain and myalgias. Neurological: Negative for dizziness and headaches. Psychiatric/Behavioral: Positive for depression (managed with Zoloft). Negative for substance abuse. The patient is nervous/anxious (managed with Zoloft). The patient does not have insomnia. Visit Vitals  /67 (BP 1 Location: Left arm, BP Patient Position: Sitting)   Pulse 63   Temp 98.1 °F (36.7 °C) (Oral)   Resp 14   Ht 5' 5\" (1.651 m)   Wt 131 lb (59.4 kg)   LMP 12/09/2019   SpO2 99%   BMI 21.80 kg/m²       Physical Exam  Vitals signs and nursing note reviewed. Constitutional:       General: She is not in acute distress. Appearance: Normal appearance. She is well-developed, well-groomed and normal weight. She is not diaphoretic. HENT:      Head: Normocephalic and atraumatic. Right Ear: External ear normal.      Left Ear: External ear normal.   Eyes:      General:         Right eye: No discharge. Left eye: No discharge. Conjunctiva/sclera: Conjunctivae normal.      Pupils: Pupils are equal, round, and reactive to light. Neck:      Musculoskeletal: Normal range of motion and neck supple. Cardiovascular:      Rate and Rhythm: Normal rate and regular rhythm. Heart sounds: Normal heart sounds. No murmur. No friction rub. No gallop. Pulmonary:      Effort: Pulmonary effort is normal.      Breath sounds: Normal breath sounds. No wheezing. Abdominal:      General: Bowel sounds are normal.      Palpations: Abdomen is soft. Tenderness: There is no tenderness. Musculoskeletal: Normal range of motion. Neurological:      Mental Status: She is alert and oriented to person, place, and time. Deep Tendon Reflexes: Reflexes are normal and symmetric. Psychiatric:         Behavior: Behavior normal.         Thought Content: Thought content normal.         ASSESSMENT and PLAN    ICD-10-CM ICD-9-CM    1. Anxiety and depression F41.9 300.00 sertraline (ZOLOFT) 50 mg tablet sent to pharmacy. F32.9 311 Zoloft 50 mg refilled. Anxiety and depression are well managed on current dosage.    2. Menstrual periods irregular N92.6 626.4 norgestimate-ethinyl estradiol (TRI-LO-ASTRID) 0.18/0.215/0.25 mg-25 mcg tab sent to pharmacy. Tri-Lo-Astrid refilled. 3. History of herpes simplex infection Z86.19 V12.09 valACYclovir (VALTREX) 500 mg tablet sent to pharmacy. Valtrex 500 mg refilled. This plan was reviewed with the patient and patient agrees. All questions were answered. This scribe documentation was reviewed by me and accurately reflects the examination and decisions made by me. This note will not be viewable in [address]

## 2020-02-03 DIAGNOSIS — Z86.19 HISTORY OF HERPES SIMPLEX INFECTION: ICD-10-CM

## 2020-02-03 RX ORDER — VALACYCLOVIR HYDROCHLORIDE 500 MG/1
500 TABLET, FILM COATED ORAL DAILY
Qty: 30 TAB | Refills: 3 | Status: SHIPPED | OUTPATIENT
Start: 2020-02-03 | End: 2020-05-03

## 2020-05-27 DIAGNOSIS — N92.0 MENORRHAGIA WITH REGULAR CYCLE: Primary | ICD-10-CM

## 2020-05-27 RX ORDER — NORGESTIMATE AND ETHINYL ESTRADIOL 7DAYSX3 LO
1 KIT ORAL DAILY
Qty: 30 TAB | Refills: 0 | Status: SHIPPED | OUTPATIENT
Start: 2020-05-27 | End: 2020-06-26

## 2020-07-06 DIAGNOSIS — B00.9 RECURRENT HERPES SIMPLEX: Primary | ICD-10-CM

## 2020-07-06 RX ORDER — VALACYCLOVIR HYDROCHLORIDE 500 MG/1
500 TABLET, FILM COATED ORAL 2 TIMES DAILY
Qty: 20 TAB | Refills: 0 | Status: SHIPPED | OUTPATIENT
Start: 2020-07-06 | End: 2020-07-16

## 2020-07-16 DIAGNOSIS — F32.A ANXIETY AND DEPRESSION: ICD-10-CM

## 2020-07-16 DIAGNOSIS — F41.9 ANXIETY AND DEPRESSION: ICD-10-CM

## 2020-07-16 RX ORDER — SERTRALINE HYDROCHLORIDE 50 MG/1
50 TABLET, FILM COATED ORAL DAILY
Qty: 90 TAB | Refills: 1 | Status: SHIPPED | OUTPATIENT
Start: 2020-07-16 | End: 2021-01-11 | Stop reason: SDUPTHER

## 2020-08-03 DIAGNOSIS — B00.9 RECURRENT HERPES SIMPLEX: Primary | ICD-10-CM

## 2020-08-03 RX ORDER — VALACYCLOVIR HYDROCHLORIDE 500 MG/1
500 TABLET, FILM COATED ORAL 2 TIMES DAILY
Qty: 20 TAB | Refills: 0 | Status: SHIPPED | OUTPATIENT
Start: 2020-08-03 | End: 2020-08-17 | Stop reason: SDUPTHER

## 2020-08-17 DIAGNOSIS — B00.9 RECURRENT HERPES SIMPLEX: ICD-10-CM

## 2020-08-17 RX ORDER — VALACYCLOVIR HYDROCHLORIDE 500 MG/1
500 TABLET, FILM COATED ORAL 2 TIMES DAILY
Qty: 20 TAB | Refills: 0 | Status: SHIPPED | OUTPATIENT
Start: 2020-08-17 | End: 2020-09-03 | Stop reason: SDUPTHER

## 2020-09-01 ENCOUNTER — OFFICE VISIT (OUTPATIENT)
Dept: PRIMARY CARE CLINIC | Age: 20
End: 2020-09-01
Payer: COMMERCIAL

## 2020-09-01 VITALS
WEIGHT: 132 LBS | SYSTOLIC BLOOD PRESSURE: 114 MMHG | HEIGHT: 65 IN | OXYGEN SATURATION: 98 % | DIASTOLIC BLOOD PRESSURE: 77 MMHG | TEMPERATURE: 97 F | RESPIRATION RATE: 16 BRPM | HEART RATE: 65 BPM | BODY MASS INDEX: 21.99 KG/M2

## 2020-09-01 DIAGNOSIS — Z23 NEED FOR DIPHTHERIA-TETANUS-PERTUSSIS (TDAP) VACCINE: ICD-10-CM

## 2020-09-01 DIAGNOSIS — Z23 ENCOUNTER FOR IMMUNIZATION: ICD-10-CM

## 2020-09-01 DIAGNOSIS — F32.A ANXIETY AND DEPRESSION: ICD-10-CM

## 2020-09-01 DIAGNOSIS — F41.9 ANXIETY AND DEPRESSION: ICD-10-CM

## 2020-09-01 DIAGNOSIS — Z00.00 PHYSICAL EXAM: Primary | ICD-10-CM

## 2020-09-01 PROCEDURE — 90686 IIV4 VACC NO PRSV 0.5 ML IM: CPT | Performed by: INTERNAL MEDICINE

## 2020-09-01 PROCEDURE — 99395 PREV VISIT EST AGE 18-39: CPT | Performed by: INTERNAL MEDICINE

## 2020-09-01 PROCEDURE — 90471 IMMUNIZATION ADMIN: CPT | Performed by: INTERNAL MEDICINE

## 2020-09-01 RX ORDER — VALACYCLOVIR HYDROCHLORIDE 500 MG/1
TABLET, FILM COATED ORAL 2 TIMES DAILY
COMMUNITY
End: 2020-09-08 | Stop reason: SDUPTHER

## 2020-09-01 NOTE — PROGRESS NOTES
Written by Rich Delgado, as dictated by Dr. Merlene Del Toro MD.    Humera Escobedo is a 23 y.o. female. HPI  The patient comes in today for a complete physical examination. She continues on Zoloft for her depression and anxiety, and she feels as though it is working well for her. She had a UTI infection a few weeks ago, and she went to [facility] and got an antibiotic. She is feeling much better now. She had an STD screening at that time as well, and it returned normal. Her menstrual cycle is normal, and she has finished her antibiotic course. She is sexually active and is taking oral contraceptives. She does not use condoms as she is in a new committed relationship. Her last Pap smear was on 8/6/2018 and returned normal.     She has lived in [place] over the summer and had no issues with COVID, but she is seeing more people getting it now that people are returning for school. However, she is staying safe and has no sick contacts. Her last Tdap vaccine was in 2010. Patient Active Problem List   Diagnosis Code    Anxiety and depression F41.9, F32.9        Current Outpatient Medications on File Prior to Visit   Medication Sig Dispense Refill    valACYclovir (Valtrex) 500 mg tablet Take  by mouth two (2) times a day.  sertraline (Zoloft) 50 mg tablet Take 1 Tab by mouth daily for 90 days. 90 Tab 1     No current facility-administered medications on file prior to visit.         No Known Allergies    Past Medical History:   Diagnosis Date    History of IBS        Past Surgical History:   Procedure Laterality Date    AR RF TONGUE BASE VOL REDUXN      Age 89 [address]       Family History   Problem Relation Age of Onset    No Known Problems Mother     No Known Problems Father     No Known Problems Sister     No Known Problems Brother         3 half brothers       Social History     Socioeconomic History    Marital status: UNKNOWN     Spouse name: Not on file  Number of children: Not on file    Years of education: Not on file    Highest education level: Not on file   Occupational History    Not on file   Social Needs    Financial resource strain: Not on file    Food insecurity     Worry: Not on file     Inability: Not on file    Transportation needs     Medical: Not on file     Non-medical: Not on file   Tobacco Use    Smoking status: Never Smoker    Smokeless tobacco: Never Used   Substance and Sexual Activity    Alcohol use: Yes     Alcohol/week: 3.0 standard drinks     Types: 1 Glasses of wine, 1 Cans of beer, 1 Shots of liquor per week     Comment: 2 x weekly    Drug use: No    Sexual activity: Yes     Partners: Male     Birth control/protection: Pill   Lifestyle    Physical activity     Days per week: Not on file     Minutes per session: Not on file    Stress: Not on file   Relationships    Social connections     Talks on phone: Not on file     Gets together: Not on file     Attends Quaker service: Not on file     Active member of club or organization: Not on file     Attends meetings of clubs or organizations: Not on file     Relationship status: Not on file    Intimate partner violence     Fear of current or ex partner: Not on file     Emotionally abused: Not on file     Physically abused: Not on file     Forced sexual activity: Not on file   Other Topics Concern    Not on file   Social History Narrative    Not on file       No visits with results within 3 Month(s) from this visit.    Latest known visit with results is:   Office Visit on 08/06/2018   Component Date Value Ref Range Status    WBC 08/06/2018 7.6  3.4 - 10.8 x10E3/uL Final    RBC 08/06/2018 4.65  3.77 - 5.28 x10E6/uL Final    HGB 08/06/2018 14.7  11.1 - 15.9 g/dL Final    HCT 08/06/2018 44.4  34.0 - 46.6 % Final    MCV 08/06/2018 96  79 - 97 fL Final    MCH 08/06/2018 31.6  26.6 - 33.0 pg Final    MCHC 08/06/2018 33.1  31.5 - 35.7 g/dL Final    RDW 08/06/2018 12.1* 12.3 - 15.4 % Final    PLATELET 53/97/9533 468  150 - 379 x10E3/uL Final    Glucose 08/06/2018 102* 65 - 99 mg/dL Final    BUN 08/06/2018 11  6 - 20 mg/dL Final    Creatinine 08/06/2018 0.68  0.57 - 1.00 mg/dL Final    GFR est non-AA 08/06/2018 127  >59 mL/min/1.73 Final    GFR est AA 08/06/2018 147  >59 mL/min/1.73 Final    BUN/Creatinine ratio 08/06/2018 16  9 - 23 Final    Sodium 08/06/2018 140  134 - 144 mmol/L Final    Potassium 08/06/2018 4.1  3.5 - 5.2 mmol/L Final    Chloride 08/06/2018 101  96 - 106 mmol/L Final    CO2 08/06/2018 25  20 - 29 mmol/L Final    Calcium 08/06/2018 9.7  8.7 - 10.2 mg/dL Final    Protein, total 08/06/2018 7.6  6.0 - 8.5 g/dL Final    Albumin 08/06/2018 4.8  3.5 - 5.5 g/dL Final    GLOBULIN, TOTAL 08/06/2018 2.8  1.5 - 4.5 g/dL Final    A-G Ratio 08/06/2018 1.7  1.2 - 2.2 Final    Bilirubin, total 08/06/2018 0.3  0.0 - 1.2 mg/dL Final    Alk. phosphatase 08/06/2018 44  39 - 117 IU/L Final    AST (SGOT) 08/06/2018 19  0 - 40 IU/L Final    ALT (SGPT) 08/06/2018 12  0 - 32 IU/L Final    TSH 08/06/2018 1.150  0.450 - 4.500 uIU/mL Final     Review of Systems   Constitutional: Negative for malaise/fatigue and weight loss. HENT: Negative for congestion and sore throat. Eyes: Negative for blurred vision. Respiratory: Negative for cough and shortness of breath. Cardiovascular: Negative for chest pain and leg swelling. Gastrointestinal: Negative for constipation and heartburn. Genitourinary: Negative for frequency and urgency. Musculoskeletal: Negative for back pain, joint pain and myalgias. Neurological: Negative for dizziness and headaches. Psychiatric/Behavioral: Negative for depression. The patient is not nervous/anxious and does not have insomnia.       Visit Vitals  /77 (BP 1 Location: Left arm, BP Patient Position: Sitting)   Pulse 65   Temp 97 °F (36.1 °C) (Temporal)   Resp 16   Ht 5' 5\" (1.651 m)   Wt 132 lb (59.9 kg)   LMP 08/12/2020   SpO2 98%   BMI 21.97 kg/m²     Physical Exam  Vitals signs and nursing note reviewed. Constitutional:       General: She is not in acute distress. Appearance: Normal appearance. She is well-developed, well-groomed and normal weight. She is not diaphoretic. HENT:      Right Ear: Tympanic membrane, ear canal and external ear normal.      Left Ear: Tympanic membrane, ear canal and external ear normal.      Mouth/Throat:      Mouth: Mucous membranes are moist.      Pharynx: Oropharynx is clear. Eyes:      General: No scleral icterus. Right eye: No discharge. Left eye: No discharge. Extraocular Movements: Extraocular movements intact. Neck:      Musculoskeletal: Normal range of motion and neck supple. Thyroid: No thyromegaly. Cardiovascular:      Rate and Rhythm: Normal rate and regular rhythm. Pulses: Normal pulses. Dorsalis pedis pulses are 2+ on the right side and 2+ on the left side. Pulmonary:      Effort: Pulmonary effort is normal.      Breath sounds: Normal breath sounds. No wheezing. Abdominal:      General: Bowel sounds are normal. There is no distension. Tenderness: There is no abdominal tenderness. Musculoskeletal:      Right lower leg: No edema. Left lower leg: No edema. Comments: B/L knees without crepitus   Lymphadenopathy:      Cervical: No cervical adenopathy. Neurological:      Mental Status: She is alert and oriented to person, place, and time. Deep Tendon Reflexes:      Reflex Scores:       Patellar reflexes are 2+ on the right side and 2+ on the left side. Psychiatric:         Mood and Affect: Mood and affect normal.         Behavior: Behavior normal.       ASSESSMENT and PLAN    ICD-10-CM ICD-9-CM    1. Physical exam  Z00.00 V70.9 LIPID PANEL    Complete physical exam done. Basic labs drawn. 2. Encounter for immunization  Z23 V03.89 INFLUENZA VIRUS VAC QUAD,SPLIT,PRESV FREE SYRINGE IM administered in office today.  Pt tolerated well. CBC WITH AUTOMATED DIFF      METABOLIC PANEL, COMPREHENSIVE      TSH 3RD GENERATION    Labs drawn in office today. 3. Anxiety and depression  F41.9 300.00 Stable, continues on Zoloft. F32.9 311    4. Need for diphtheria-tetanus-pertussis (Tdap) vaccine  Z23 V06.1 diph,Pertuss,Acell,,Tet Vac-PF (ADACEL) 2 Lf-(2.5-5-3-5 mcg)-5Lf/0.5 mL susp sent to pharmacy. Potential side effects were discussed. I prescribed the Tdap vaccine for health maintenance. This plan was reviewed with the patient and patient agrees. All questions were answered. This scribe documentation was reviewed by me and accurately reflects the examination and decisions made by me. This note will not be viewable in [address]

## 2020-09-01 NOTE — PROGRESS NOTES
Chief Complaint   Patient presents with    Complete Physical     states that she is in [place] and at [facility] and there are some new cases of covid but she is not haing any symptoms.

## 2020-09-02 DIAGNOSIS — Z23 ENCOUNTER FOR IMMUNIZATION: ICD-10-CM

## 2020-09-02 DIAGNOSIS — Z00.00 PHYSICAL EXAM: ICD-10-CM

## 2020-09-03 DIAGNOSIS — B00.9 RECURRENT HERPES SIMPLEX: ICD-10-CM

## 2020-09-03 RX ORDER — VALACYCLOVIR HYDROCHLORIDE 500 MG/1
500 TABLET, FILM COATED ORAL 2 TIMES DAILY
Qty: 20 TAB | Refills: 0 | Status: SHIPPED | OUTPATIENT
Start: 2020-09-03 | End: 2020-09-13

## 2020-09-07 DIAGNOSIS — Z78.9 USES BIRTH CONTROL: Primary | ICD-10-CM

## 2020-09-07 RX ORDER — NORGESTIMATE AND ETHINYL ESTRADIOL 7DAYSX3 LO
1 KIT ORAL DAILY
Qty: 30 TAB | Refills: 2 | Status: SHIPPED | OUTPATIENT
Start: 2020-09-07 | End: 2020-11-08 | Stop reason: SDUPTHER

## 2020-09-09 RX ORDER — VALACYCLOVIR HYDROCHLORIDE 500 MG/1
500 TABLET, FILM COATED ORAL 2 TIMES DAILY
Qty: 20 TAB | Refills: 0 | Status: SHIPPED | OUTPATIENT
Start: 2020-09-09 | End: 2020-09-22 | Stop reason: SDUPTHER

## 2020-09-21 ENCOUNTER — PATIENT MESSAGE (OUTPATIENT)
Dept: PRIMARY CARE CLINIC | Age: 20
End: 2020-09-21

## 2020-09-22 DIAGNOSIS — B00.9 RECURRENT HERPES SIMPLEX: Primary | ICD-10-CM

## 2020-09-22 RX ORDER — VALACYCLOVIR HYDROCHLORIDE 500 MG/1
500 TABLET, FILM COATED ORAL DAILY
Qty: 30 TAB | Refills: 0 | Status: SHIPPED | OUTPATIENT
Start: 2020-09-22 | End: 2020-10-12 | Stop reason: SDUPTHER

## 2020-09-22 RX ORDER — VALACYCLOVIR HYDROCHLORIDE 500 MG/1
500 TABLET, FILM COATED ORAL 2 TIMES DAILY
Qty: 20 TAB | Refills: 0 | Status: SHIPPED | OUTPATIENT
Start: 2020-09-22 | End: 2020-10-02

## 2020-09-22 NOTE — TELEPHONE ENCOUNTER
From: Felipe Moreno  Sent: 9/22/2020 12:14 PM EDT  To: [facility]  Subject: RE: Prescription Question    The valtrex prescription should be for 1 a day for 30 days. Thanks!

## 2020-10-12 DIAGNOSIS — B00.9 RECURRENT HERPES SIMPLEX: ICD-10-CM

## 2020-10-12 RX ORDER — VALACYCLOVIR HYDROCHLORIDE 500 MG/1
500 TABLET, FILM COATED ORAL DAILY
Qty: 30 TAB | Refills: 0 | Status: SHIPPED | OUTPATIENT
Start: 2020-10-12 | End: 2020-10-15

## 2020-10-15 DIAGNOSIS — B00.9 RECURRENT HERPES SIMPLEX: ICD-10-CM

## 2020-10-15 RX ORDER — VALACYCLOVIR HYDROCHLORIDE 500 MG/1
TABLET, FILM COATED ORAL
Qty: 30 TAB | Refills: 0 | Status: SHIPPED | OUTPATIENT
Start: 2020-10-15 | End: 2020-11-08 | Stop reason: SDUPTHER

## 2020-11-08 DIAGNOSIS — B00.9 RECURRENT HERPES SIMPLEX: ICD-10-CM

## 2020-11-08 DIAGNOSIS — Z78.9 USES BIRTH CONTROL: ICD-10-CM

## 2020-11-09 RX ORDER — VALACYCLOVIR HYDROCHLORIDE 500 MG/1
500 TABLET, FILM COATED ORAL DAILY
Qty: 90 TAB | Refills: 0 | Status: SHIPPED | OUTPATIENT
Start: 2020-11-09 | End: 2020-12-20 | Stop reason: SDUPTHER

## 2020-11-09 RX ORDER — NORGESTIMATE AND ETHINYL ESTRADIOL 7DAYSX3 LO
1 KIT ORAL DAILY
Qty: 30 TAB | Refills: 2 | Status: SHIPPED | OUTPATIENT
Start: 2020-11-09 | End: 2021-01-28

## 2020-12-20 DIAGNOSIS — B00.9 RECURRENT HERPES SIMPLEX: ICD-10-CM

## 2020-12-21 RX ORDER — VALACYCLOVIR HYDROCHLORIDE 500 MG/1
500 TABLET, FILM COATED ORAL DAILY
Qty: 90 TAB | Refills: 0 | Status: SHIPPED | OUTPATIENT
Start: 2020-12-21 | End: 2021-01-13 | Stop reason: SDUPTHER

## 2021-01-04 DIAGNOSIS — B00.9 RECURRENT HERPES SIMPLEX: ICD-10-CM

## 2021-01-04 RX ORDER — VALACYCLOVIR HYDROCHLORIDE 500 MG/1
500 TABLET, FILM COATED ORAL DAILY
Qty: 90 TAB | Refills: 0 | Status: CANCELLED | OUTPATIENT
Start: 2021-01-04 | End: 2021-04-04

## 2021-01-11 DIAGNOSIS — F32.A ANXIETY AND DEPRESSION: ICD-10-CM

## 2021-01-11 DIAGNOSIS — F41.9 ANXIETY AND DEPRESSION: ICD-10-CM

## 2021-01-11 RX ORDER — SERTRALINE HYDROCHLORIDE 50 MG/1
50 TABLET, FILM COATED ORAL DAILY
Qty: 90 TAB | Refills: 1 | Status: SHIPPED | OUTPATIENT
Start: 2021-01-11 | End: 2021-04-11

## 2021-01-13 DIAGNOSIS — B00.9 RECURRENT HERPES SIMPLEX: ICD-10-CM

## 2021-01-14 RX ORDER — VALACYCLOVIR HYDROCHLORIDE 500 MG/1
500 TABLET, FILM COATED ORAL DAILY
Qty: 90 TAB | Refills: 0 | Status: SHIPPED | OUTPATIENT
Start: 2021-01-14 | End: 2021-02-25 | Stop reason: SDUPTHER

## 2021-01-14 NOTE — TELEPHONE ENCOUNTER
Last office visit 9/1/2020  Last med refill 12/21/2020 for 90 day supply to [facility]. Received only 5 tabs at [place] pharmacist states that she needs 90 day supply for insurance to pay.

## 2021-01-20 DIAGNOSIS — B00.9 RECURRENT HERPES SIMPLEX: ICD-10-CM

## 2021-01-20 RX ORDER — VALACYCLOVIR HYDROCHLORIDE 500 MG/1
500 TABLET, FILM COATED ORAL DAILY
Qty: 90 TAB | Refills: 0 | Status: CANCELLED | OUTPATIENT
Start: 2021-01-20 | End: 2021-04-20

## 2021-01-28 DIAGNOSIS — Z78.9 USES BIRTH CONTROL: ICD-10-CM

## 2021-01-28 RX ORDER — NORGESTIMATE AND ETHINYL ESTRADIOL 7DAYSX3 LO
KIT ORAL
Qty: 84 TAB | Refills: 1 | Status: SHIPPED | OUTPATIENT
Start: 2021-01-28 | End: 2021-07-28

## 2021-02-22 DIAGNOSIS — B00.9 RECURRENT HERPES SIMPLEX: ICD-10-CM

## 2021-02-22 RX ORDER — VALACYCLOVIR HYDROCHLORIDE 500 MG/1
500 TABLET, FILM COATED ORAL DAILY
Qty: 90 TAB | Refills: 0 | Status: CANCELLED | OUTPATIENT
Start: 2021-02-22 | End: 2021-05-23

## 2021-02-25 DIAGNOSIS — B00.9 RECURRENT HERPES SIMPLEX: ICD-10-CM

## 2021-02-25 RX ORDER — VALACYCLOVIR HYDROCHLORIDE 500 MG/1
500 TABLET, FILM COATED ORAL DAILY
Qty: 90 TAB | Refills: 0 | Status: SHIPPED | OUTPATIENT
Start: 2021-02-25 | End: 2021-05-07 | Stop reason: SDUPTHER

## 2021-02-25 RX ORDER — VALACYCLOVIR HYDROCHLORIDE 500 MG/1
500 TABLET, FILM COATED ORAL DAILY
Qty: 90 TAB | Refills: 0 | Status: CANCELLED | OUTPATIENT
Start: 2021-02-25 | End: 2021-05-26

## 2021-02-25 RX ORDER — VALACYCLOVIR HYDROCHLORIDE 500 MG/1
500 TABLET, FILM COATED ORAL DAILY
Qty: 90 TAB | Refills: 0 | Status: SHIPPED | OUTPATIENT
Start: 2021-02-25 | End: 2021-02-25 | Stop reason: SDUPTHER

## 2021-05-07 DIAGNOSIS — B00.9 RECURRENT HERPES SIMPLEX: ICD-10-CM

## 2021-05-07 RX ORDER — VALACYCLOVIR HYDROCHLORIDE 500 MG/1
500 TABLET, FILM COATED ORAL DAILY
Qty: 90 TAB | Refills: 0 | Status: SHIPPED | OUTPATIENT
Start: 2021-05-07 | End: 2021-06-18 | Stop reason: SDUPTHER

## 2021-05-07 NOTE — TELEPHONE ENCOUNTER
Requested Prescriptions     Pending Prescriptions Disp Refills    valACYclovir (VALTREX) 500 mg tablet 90 Tab 0     Sig: Take 1 Tab by mouth daily for 90 days.  TAKE 1 TABLET BY MOUTH EVERY DAY        Last Visit 9/1/20  Last Refill 2/25/21

## 2021-06-18 ENCOUNTER — PATIENT MESSAGE (OUTPATIENT)
Dept: PRIMARY CARE CLINIC | Age: 21
End: 2021-06-18

## 2021-06-18 DIAGNOSIS — B00.9 RECURRENT HERPES SIMPLEX: ICD-10-CM

## 2021-06-18 RX ORDER — VALACYCLOVIR HYDROCHLORIDE 500 MG/1
500 TABLET, FILM COATED ORAL DAILY
Qty: 90 TABLET | Refills: 0 | Status: SHIPPED | OUTPATIENT
Start: 2021-06-18 | End: 2021-08-02

## 2021-06-18 NOTE — TELEPHONE ENCOUNTER
From: La Damon  Sent: 6/18/2021 2:15 PM EDT  To: [facility]  Subject: RE: Prescription Question    [facility] on [address] [facility] !

## 2021-06-18 NOTE — TELEPHONE ENCOUNTER
Requested Prescriptions     Pending Prescriptions Disp Refills    valACYclovir (VALTREX) 500 mg tablet 90 Tablet 0     Sig: Take 1 Tablet by mouth daily for 90 days.  TAKE 1 TABLET BY MOUTH EVERY DAY        Last Visit 9/1/20  Last Refill 5/7/21

## 2021-06-29 DIAGNOSIS — F32.A ANXIETY AND DEPRESSION: Primary | ICD-10-CM

## 2021-06-29 DIAGNOSIS — F41.9 ANXIETY AND DEPRESSION: Primary | ICD-10-CM

## 2021-06-29 RX ORDER — SERTRALINE HYDROCHLORIDE 50 MG/1
50 TABLET, FILM COATED ORAL DAILY
Qty: 90 TABLET | Refills: 0 | Status: SHIPPED | OUTPATIENT
Start: 2021-06-29 | End: 2021-07-04

## 2021-07-04 DIAGNOSIS — F41.9 ANXIETY AND DEPRESSION: ICD-10-CM

## 2021-07-04 DIAGNOSIS — F32.A ANXIETY AND DEPRESSION: ICD-10-CM

## 2021-07-04 RX ORDER — SERTRALINE HYDROCHLORIDE 50 MG/1
TABLET, FILM COATED ORAL
Qty: 90 TABLET | Refills: 1 | Status: SHIPPED | OUTPATIENT
Start: 2021-07-04 | End: 2021-09-07 | Stop reason: SDUPTHER

## 2021-07-27 ENCOUNTER — PATIENT MESSAGE (OUTPATIENT)
Dept: PRIMARY CARE CLINIC | Age: 21
End: 2021-07-27

## 2021-07-27 NOTE — TELEPHONE ENCOUNTER
From: Piotr Alejandro  To: Clemencia Sanchez MD  Sent: 7/27/2021 3:18 PM EDT  Subject: Prescription Question    Hi! I am having some trouble with my Valiciclovir amounts. The pharmacy keeps giving me 6 pills every six days, instead of 30 dayd or 90 days. Will you please send an updated prescription with the proper monthly amount so I do not have to go to the pharmacy every week? Thanks!

## 2021-07-27 NOTE — TELEPHONE ENCOUNTER
Spoke with patient- I had called her pharmacy, she was receiving 6 at a time due to an earlier ask from when she was traveling and it had saved in their system.  She will be calling her pharmacy to let them know she's wanting the rest on th 30 pills in full

## 2021-07-28 DIAGNOSIS — Z78.9 USES BIRTH CONTROL: ICD-10-CM

## 2021-07-28 RX ORDER — NORGESTIMATE AND ETHINYL ESTRADIOL 7DAYSX3 LO
KIT ORAL
Qty: 84 TABLET | Refills: 1 | Status: SHIPPED | OUTPATIENT
Start: 2021-07-28 | End: 2021-12-13

## 2021-08-02 DIAGNOSIS — B00.9 RECURRENT HERPES SIMPLEX: ICD-10-CM

## 2021-08-02 RX ORDER — VALACYCLOVIR HYDROCHLORIDE 500 MG/1
TABLET, FILM COATED ORAL
Qty: 90 TABLET | Refills: 0 | Status: SHIPPED | OUTPATIENT
Start: 2021-08-02 | End: 2021-09-20

## 2021-08-15 DIAGNOSIS — B00.9 RECURRENT HERPES SIMPLEX: ICD-10-CM

## 2021-08-15 RX ORDER — VALACYCLOVIR HYDROCHLORIDE 500 MG/1
TABLET, FILM COATED ORAL
Qty: 90 TABLET | Refills: 0 | Status: CANCELLED | OUTPATIENT
Start: 2021-08-15

## 2021-09-07 ENCOUNTER — OFFICE VISIT (OUTPATIENT)
Dept: PRIMARY CARE CLINIC | Age: 21
End: 2021-09-07
Payer: COMMERCIAL

## 2021-09-07 VITALS
RESPIRATION RATE: 18 BRPM | TEMPERATURE: 97.1 F | BODY MASS INDEX: 23.66 KG/M2 | HEIGHT: 65 IN | DIASTOLIC BLOOD PRESSURE: 70 MMHG | SYSTOLIC BLOOD PRESSURE: 109 MMHG | HEART RATE: 71 BPM | WEIGHT: 142 LBS | OXYGEN SATURATION: 97 %

## 2021-09-07 DIAGNOSIS — Z01.419 WELL FEMALE EXAM WITH ROUTINE GYNECOLOGICAL EXAM: ICD-10-CM

## 2021-09-07 DIAGNOSIS — F32.A ANXIETY AND DEPRESSION: ICD-10-CM

## 2021-09-07 DIAGNOSIS — Z11.59 NEED FOR HEPATITIS C SCREENING TEST: ICD-10-CM

## 2021-09-07 DIAGNOSIS — K59.04 CHRONIC IDIOPATHIC CONSTIPATION: ICD-10-CM

## 2021-09-07 DIAGNOSIS — Z00.00 PHYSICAL EXAM: ICD-10-CM

## 2021-09-07 DIAGNOSIS — K21.9 GASTROESOPHAGEAL REFLUX DISEASE WITHOUT ESOPHAGITIS: Primary | ICD-10-CM

## 2021-09-07 DIAGNOSIS — F41.9 ANXIETY AND DEPRESSION: ICD-10-CM

## 2021-09-07 DIAGNOSIS — Z23 NEED FOR DIPHTHERIA-TETANUS-PERTUSSIS (TDAP) VACCINE: ICD-10-CM

## 2021-09-07 PROCEDURE — 99214 OFFICE O/P EST MOD 30 MIN: CPT | Performed by: INTERNAL MEDICINE

## 2021-09-07 RX ORDER — AMOXICILLIN 250 MG
1 CAPSULE ORAL DAILY
Qty: 90 TABLET | Refills: 0 | Status: SHIPPED | OUTPATIENT
Start: 2021-09-07 | End: 2021-12-06

## 2021-09-07 RX ORDER — OMEPRAZOLE 40 MG/1
40 CAPSULE, DELAYED RELEASE ORAL DAILY
Qty: 30 CAPSULE | Refills: 0 | Status: SHIPPED | OUTPATIENT
Start: 2021-09-07 | End: 2021-10-08

## 2021-09-07 RX ORDER — SERTRALINE HYDROCHLORIDE 50 MG/1
50 TABLET, FILM COATED ORAL DAILY
Qty: 90 TABLET | Refills: 1 | Status: SHIPPED | OUTPATIENT
Start: 2021-09-07 | End: 2022-03-21 | Stop reason: SDUPTHER

## 2021-09-07 NOTE — PROGRESS NOTES
Jose Hatch (: 2000) is a 19 y.o. female, established patient, here for evaluation of the following chief complaint(s):  Physical (labs)     Written by Calvin Fontenot, as dictated by Dr. Shaniqua Olivares MD.      ASSESSMENT/PLAN:  Below is the assessment and plan developed based on review of pertinent history, physical exam, labs, studies, and medications. 1. Gastroesophageal reflux disease without esophagitis  Prescribed Prilosec 40 mg, take 1 tab daily as prescribed. Potential side effects were discussed. -     omeprazole (PRILOSEC) 40 mg capsule; Take 1 Capsule by mouth daily for 30 days. , Normal, Disp-30 Capsule, R-0 sent to pharmacy. 2. Chronic idiopathic constipation  Prescribed senna-docusate 8.6-50 mg, take 1 tab daily as prescribed. Potential side effects were discussed. -     senna-docusate (PERICOLACE) 8.6-50 mg per tablet; Take 1 Tablet by mouth daily for 90 days. , Normal, Disp-90 Tablet, R-0 sent to pharmacy. 3. Anxiety and depression  Well controlled on current medication. Continue taking Zoloft 50 mg as prescribed. -     sertraline (ZOLOFT) 50 mg tablet; Take 1 Tablet by mouth daily. , Normal, Disp-90 Tablet, R-1 sent to pharmacy. 4. Physical exam  Complete physical done today. Routine lab work ordered. Waiting for results. Recommend that patient take a Vitamin D supplement of 1,000 units daily.  -     METABOLIC PANEL, COMPREHENSIVE; Future  -     CBC W/O DIFF; Future  -     TSH 3RD GENERATION; Future    5. Need for hepatitis C screening test  Ordered Hepatitis C test. Waiting for results.  -     HEPATITIS C AB; Future    6. Well female exam with routine gynecological exam  Completed a Pap smear in the office today. Waiting for results. -     PAP IG, APTIMA HPV AND RFX 16/18,45 ([id number]); Future    7. Need for diphtheria-tetanus-pertussis (Tdap) vaccine  Prescribed a Tdap vaccine to be administered at her local pharmacy.    -     diphth,pertus,acell,,tetanus ([address] TDAP) 2.5-8-5 Lf-mcg-Lf/0.5mL susp suspension; 0.5 mL by IntraMUSCular route once for 1 dose., Normal, Disp-0.5 mL, R-0 sent to pharmacy. SUBJECTIVE/OBJECTIVE:  HPI     Patient presents today with c/o GERD , constipation and requesting a physical exam. She c/o occasional constipation. She takes Zoloft 50 mg for anxiety and depression. She notes the medication works well. Her last Pap smear was in 2018 and she is not currently followed by an OBGYN. She takes Tri-Lo-Anastacia for birth control. She denies any side effects from the medication. Her menstrual cycle is regular. She c/o seasonal allergies. She takes OTC Zyrtec and notes the medication works well. She c/o occasional heartburn. She knows what her triggers are and avoids them. She takes OTC medications for constipation with minimum relief. Patient Active Problem List   Diagnosis Code    Anxiety and depression F41.9, F32.9        Current Outpatient Medications on File Prior to Visit   Medication Sig Dispense Refill    valACYclovir (VALTREX) 500 mg tablet TAKE 1 TAB BY MOUTH DAILY FOR 90 DAYS. TAKE 1 TABLET BY MOUTH EVERY DAY 90 Tablet 0    Tri-Lo-Anastacia 0.18/0.215/0.25 mg-25 mcg tab TAKE 1 TABLET BY MOUTH EVERY DAY 84 Tablet 1    [DISCONTINUED] sertraline (ZOLOFT) 50 mg tablet TAKE 1 TABLET BY MOUTH EVERY DAY 90 Tablet 1     No current facility-administered medications on file prior to visit.        No Known Allergies    Past Medical History:   Diagnosis Date    History of IBS        Past Surgical History:   Procedure Laterality Date    MA RF TONGUE BASE VOL REDUXN      Age 10       Family History   Problem Relation Age of Onset    No Known Problems Mother     No Known Problems Father     No Known Problems Sister     No Known Problems Brother         3 half brothers       Social History     Socioeconomic History    Marital status: UNKNOWN     Spouse name: Not on file    Number of children: Not on file    Years of education: Not on file    Highest education level: Not on file   Occupational History    Not on file   Tobacco Use    Smoking status: Never Smoker    Smokeless tobacco: Never Used   Substance and Sexual Activity    Alcohol use: Yes     Alcohol/week: 3.0 standard drinks     Types: 1 Glasses of wine, 1 Cans of beer, 1 Shots of liquor per week     Comment: 2 x weekly    Drug use: No    Sexual activity: Yes     Partners: Male     Birth control/protection: Pill   Other Topics Concern    Not on file   Social History Narrative    Not on file     Social Determinants of Health     Financial Resource Strain:     Difficulty of Paying Living Expenses:    Food Insecurity:     Worried About Running Out of Food in the Last Year:     Ran Out of Food in the Last Year:    Transportation Needs:     Lack of Transportation (Medical):  Lack of Transportation (Non-Medical):    Physical Activity:     Days of Exercise per Week:     Minutes of Exercise per Session:    Stress:     Feeling of Stress :    Social Connections:     Frequency of Communication with Friends and Family:     Frequency of Social Gatherings with Friends and Family:     Attends Ep[employer]op[employer] Services:     Active Member of Clubs or Organizations:     Attends Club or Organization Meetings:     Marital Status:    Intimate Partner Violence:     Fear of Current or Ex-Partner:     Emotionally Abused:     Physically Abused:     Sexually Abused:        No visits with results within 3 Month(s) from this visit.    Latest known visit with results is:   Office Visit on 08/06/2018   Component Date Value Ref Range Status    WBC 08/06/2018 7.6  3.4 - 10.8 x10E3/uL Final    RBC 08/06/2018 4.65  3.77 - 5.28 x10E6/uL Final    HGB 08/06/2018 14.7  11.1 - 15.9 g/dL Final    HCT 08/06/2018 44.4  34.0 - 46.6 % Final    MCV 08/06/2018 96  79 - 97 fL Final    MCH 08/06/2018 31.6  26.6 - 33.0 pg Final    MCHC 08/06/2018 33.1  31.5 - 35.7 g/dL Final    RDW 08/06/2018 12.1* 12.3 - 15.4 % Final    PLATELET 53/04/3913 759  150 - 379 x10E3/uL Final    Glucose 08/06/2018 102* 65 - 99 mg/dL Final    BUN 08/06/2018 11  6 - 20 mg/dL Final    Creatinine 08/06/2018 0.68  0.57 - 1.00 mg/dL Final    GFR est non-AA 08/06/2018 127  >59 mL/min/1.73 Final    GFR est AA 08/06/2018 147  >59 mL/min/1.73 Final    BUN/Creatinine ratio 08/06/2018 16  9 - 23 Final    Sodium 08/06/2018 140  134 - 144 mmol/L Final    Potassium 08/06/2018 4.1  3.5 - 5.2 mmol/L Final    Chloride 08/06/2018 101  96 - 106 mmol/L Final    CO2 08/06/2018 25  20 - 29 mmol/L Final    Calcium 08/06/2018 9.7  8.7 - 10.2 mg/dL Final    Protein, total 08/06/2018 7.6  6.0 - 8.5 g/dL Final    Albumin 08/06/2018 4.8  3.5 - 5.5 g/dL Final    GLOBULIN, TOTAL 08/06/2018 2.8  1.5 - 4.5 g/dL Final    A-G Ratio 08/06/2018 1.7  1.2 - 2.2 Final    Bilirubin, total 08/06/2018 0.3  0.0 - 1.2 mg/dL Final    Alk. phosphatase 08/06/2018 44  39 - 117 IU/L Final    AST (SGOT) 08/06/2018 19  0 - 40 IU/L Final    ALT (SGPT) 08/06/2018 12  0 - 32 IU/L Final    TSH 08/06/2018 1.150  0.450 - 4.500 uIU/mL Final       Review of Systems   Constitutional: Negative for activity change, fatigue and unexpected weight change. HENT: Negative for congestion, hearing loss, rhinorrhea and sore throat. Eyes: Negative for discharge. Respiratory: Negative for cough, chest tightness and shortness of breath. Cardiovascular: Negative for leg swelling. Gastrointestinal: Positive for constipation. Negative for abdominal pain and diarrhea. Genitourinary: Negative for dysuria, flank pain, frequency and urgency. Musculoskeletal: Negative for arthralgias, back pain and myalgias. Skin: Negative for color change and rash. Allergic/Immunologic: Positive for environmental allergies. Neurological: Negative for dizziness, light-headedness and headaches. Psychiatric/Behavioral: Negative for dysphoric mood and sleep disturbance.  The patient is nervous/anxious. Visit Vitals  /70 (BP 1 Location: Right arm, BP Patient Position: Sitting)   Pulse 71   Temp 97.1 °F (36.2 °C) (Temporal)   Resp 18   Ht 5' 5\" (1.651 m)   Wt 142 lb (64.4 kg)   LMP 09/03/2021   SpO2 97%   BMI 23.63 kg/m²       Physical Exam  Vitals and nursing note reviewed. Constitutional:       General: She is not in acute distress. Appearance: Normal appearance. She is normal weight. She is not diaphoretic. HENT:      Right Ear: Tympanic membrane, ear canal and external ear normal.      Left Ear: Tympanic membrane, ear canal and external ear normal.      Mouth/Throat:      Mouth: Mucous membranes are moist.      Pharynx: Oropharynx is clear. Eyes:      General:         Right eye: No discharge. Left eye: No discharge. Extraocular Movements: Extraocular movements intact. Conjunctiva/sclera: Conjunctivae normal.   Neck:      Thyroid: No thyromegaly. Cardiovascular:      Rate and Rhythm: Normal rate and regular rhythm. Pulses: Normal pulses. Dorsalis pedis pulses are 2+ on the right side and 2+ on the left side. Pulmonary:      Effort: Pulmonary effort is normal.      Breath sounds: Normal breath sounds. No wheezing. Abdominal:      General: Bowel sounds are normal. There is no distension. Palpations: Abdomen is soft. Tenderness: There is no abdominal tenderness. Genitourinary:     General: Normal vulva. Vagina: No vaginal discharge. Rectum: Normal.   Musculoskeletal:      Right lower leg: No edema. Left lower leg: No edema. Comments: B/L knees without crepitus   Lymphadenopathy:      Cervical: No cervical adenopathy. Neurological:      Deep Tendon Reflexes:      Reflex Scores:       Patellar reflexes are 2+ on the right side and 2+ on the left side. Psychiatric:         Mood and Affect: Mood and affect normal.             An electronic signature was used to authenticate this note.   -- Kimberley Kras

## 2021-09-10 LAB
CYTOLOGIST CVX/VAG CYTO: ABNORMAL
CYTOLOGY CVX/VAG DOC CYTO: ABNORMAL
CYTOLOGY CVX/VAG DOC THIN PREP: ABNORMAL
DX ICD CODE: ABNORMAL
DX ICD CODE: ABNORMAL
HPV I/H RISK 4 DNA CVX QL PROBE+SIG AMP: NEGATIVE
Lab: ABNORMAL
OTHER STN SPEC: ABNORMAL
PATHOLOGIST CVX/VAG CYTO: ABNORMAL
STAT OF ADQ CVX/VAG CYTO-IMP: ABNORMAL

## 2021-09-11 NOTE — PROGRESS NOTES
Results reviewed. Release via [facility], your pap smear came back fine but I would recommend repeating it next year as well.

## 2021-10-01 ENCOUNTER — PATIENT MESSAGE (OUTPATIENT)
Dept: PRIMARY CARE CLINIC | Age: 21
End: 2021-10-01

## 2021-10-01 ENCOUNTER — TELEPHONE (OUTPATIENT)
Dept: PRIMARY CARE CLINIC | Age: 21
End: 2021-10-01

## 2021-10-01 DIAGNOSIS — B00.9 RECURRENT HERPES SIMPLEX: ICD-10-CM

## 2021-10-01 RX ORDER — VALACYCLOVIR HYDROCHLORIDE 500 MG/1
TABLET, FILM COATED ORAL
Qty: 20 TABLET | Refills: 0 | Status: SHIPPED | OUTPATIENT
Start: 2021-10-01 | End: 2021-10-11 | Stop reason: SDUPTHER

## 2021-10-01 NOTE — TELEPHONE ENCOUNTER
From: Jose Luis Meade  To: Jose C Garcia MD  Sent: 10/1/2021 12:11 PM EDT  Subject: Non-Urgent Medical Question    Hello! I have one questions and a refill request.    I am reading the pap smear results and I am concerned that you said there was cell abnormality. Should I be concerned? I am also requesting a refill for my valciclovir--I am almost out. Thanks!

## 2021-10-01 NOTE — TELEPHONE ENCOUNTER
Requested Prescriptions     Pending Prescriptions Disp Refills    valACYclovir (VALTREX) 500 mg tablet 20 Tablet 0     Sig: TAKE 1 TABLET BY MOUTH TWICE A DAY FOR 20 DOSES        Last Visit 9/7/21  Last Refill 9/20/21

## 2021-10-08 DIAGNOSIS — K21.9 GASTROESOPHAGEAL REFLUX DISEASE WITHOUT ESOPHAGITIS: ICD-10-CM

## 2021-10-08 RX ORDER — OMEPRAZOLE 40 MG/1
CAPSULE, DELAYED RELEASE ORAL
Qty: 30 CAPSULE | Refills: 0 | Status: SHIPPED | OUTPATIENT
Start: 2021-10-08 | End: 2021-11-03

## 2021-10-11 ENCOUNTER — PATIENT MESSAGE (OUTPATIENT)
Dept: PRIMARY CARE CLINIC | Age: 21
End: 2021-10-11

## 2021-10-11 DIAGNOSIS — B00.9 RECURRENT HERPES SIMPLEX: ICD-10-CM

## 2021-10-11 RX ORDER — VALACYCLOVIR HYDROCHLORIDE 500 MG/1
500 TABLET, FILM COATED ORAL 2 TIMES DAILY
Qty: 60 TABLET | Refills: 0 | Status: SHIPPED | OUTPATIENT
Start: 2021-10-11 | End: 2021-10-16

## 2021-10-11 NOTE — TELEPHONE ENCOUNTER
Requested Prescriptions     Pending Prescriptions Disp Refills    valACYclovir (VALTREX) 500 mg tablet 60 Tablet 0     Sig: Take 1 Tablet by mouth two (2) times a day.  TAKE 1 TABLET BY MOUTH TWICE A DAY        Last Visit 9/7/21  Last Refill 10/1/21

## 2021-10-11 NOTE — TELEPHONE ENCOUNTER
From: Gordy Bose  To: July Lewis MD  Sent: 10/11/2021 11:29 AM EDT  Subject: Prescription Question    Hi! So my 90 day supply is up for the renewal for the valtrex on the 26th, but the amount I got Oct 1st was only for 20, so I do not have enough to get me to that date. Will you please change the amount to 30 so I can have a full months worth before the 26th? Thanks !

## 2021-10-16 DIAGNOSIS — B00.9 RECURRENT HERPES SIMPLEX: ICD-10-CM

## 2021-10-16 RX ORDER — VALACYCLOVIR HYDROCHLORIDE 500 MG/1
TABLET, FILM COATED ORAL
Qty: 60 TABLET | Refills: 0 | Status: SHIPPED | OUTPATIENT
Start: 2021-10-16 | End: 2021-11-14 | Stop reason: SDUPTHER

## 2021-11-03 DIAGNOSIS — K21.9 GASTROESOPHAGEAL REFLUX DISEASE WITHOUT ESOPHAGITIS: ICD-10-CM

## 2021-11-03 RX ORDER — OMEPRAZOLE 40 MG/1
CAPSULE, DELAYED RELEASE ORAL
Qty: 30 CAPSULE | Refills: 0 | Status: SHIPPED | OUTPATIENT
Start: 2021-11-03 | End: 2021-11-27

## 2021-11-14 DIAGNOSIS — B00.9 RECURRENT HERPES SIMPLEX: ICD-10-CM

## 2021-11-15 RX ORDER — VALACYCLOVIR HYDROCHLORIDE 500 MG/1
500 TABLET, FILM COATED ORAL 2 TIMES DAILY
Qty: 60 TABLET | Refills: 0 | Status: SHIPPED | OUTPATIENT
Start: 2021-11-15 | End: 2021-11-17

## 2021-11-15 NOTE — TELEPHONE ENCOUNTER
Requested Prescriptions     Pending Prescriptions Disp Refills    valACYclovir (VALTREX) 500 mg tablet 60 Tablet 0     Sig: Take 1 Tablet by mouth two (2) times a day.         Last Visit 9/7/21  Last Refill 10/16/21

## 2021-11-17 DIAGNOSIS — B00.9 RECURRENT HERPES SIMPLEX: ICD-10-CM

## 2021-11-17 RX ORDER — VALACYCLOVIR HYDROCHLORIDE 500 MG/1
TABLET, FILM COATED ORAL
Qty: 60 TABLET | Refills: 0 | Status: SHIPPED | OUTPATIENT
Start: 2021-11-17 | End: 2021-12-07

## 2021-11-27 DIAGNOSIS — K21.9 GASTROESOPHAGEAL REFLUX DISEASE WITHOUT ESOPHAGITIS: ICD-10-CM

## 2021-11-27 RX ORDER — OMEPRAZOLE 40 MG/1
CAPSULE, DELAYED RELEASE ORAL
Qty: 30 CAPSULE | Refills: 0 | Status: SHIPPED | OUTPATIENT
Start: 2021-11-27 | End: 2021-12-13 | Stop reason: SDUPTHER

## 2021-12-07 DIAGNOSIS — B00.9 RECURRENT HERPES SIMPLEX: ICD-10-CM

## 2021-12-07 RX ORDER — VALACYCLOVIR HYDROCHLORIDE 500 MG/1
TABLET, FILM COATED ORAL
Qty: 60 TABLET | Refills: 0 | Status: SHIPPED | OUTPATIENT
Start: 2021-12-07 | End: 2022-01-04

## 2021-12-13 DIAGNOSIS — K21.9 GASTROESOPHAGEAL REFLUX DISEASE WITHOUT ESOPHAGITIS: ICD-10-CM

## 2021-12-13 DIAGNOSIS — Z78.9 USES BIRTH CONTROL: ICD-10-CM

## 2021-12-13 RX ORDER — NORGESTIMATE AND ETHINYL ESTRADIOL 7DAYSX3 LO
KIT ORAL
Qty: 84 TABLET | Refills: 1 | Status: SHIPPED | OUTPATIENT
Start: 2021-12-13 | End: 2022-06-14

## 2021-12-13 RX ORDER — OMEPRAZOLE 40 MG/1
40 CAPSULE, DELAYED RELEASE ORAL DAILY
Qty: 90 CAPSULE | Refills: 0 | Status: SHIPPED | OUTPATIENT
Start: 2021-12-13 | End: 2022-03-21 | Stop reason: ALTCHOICE

## 2021-12-13 NOTE — TELEPHONE ENCOUNTER
Requested Prescriptions     Pending Prescriptions Disp Refills    omeprazole (PRILOSEC) 40 mg capsule 90 Capsule 0     Sig: Take 1 Capsule by mouth daily.         Last Visit 9/7/21  Last Refill 11/27/21

## 2022-01-04 DIAGNOSIS — B00.9 RECURRENT HERPES SIMPLEX: ICD-10-CM

## 2022-01-04 RX ORDER — VALACYCLOVIR HYDROCHLORIDE 500 MG/1
TABLET, FILM COATED ORAL
Qty: 60 TABLET | Refills: 0 | Status: SHIPPED | OUTPATIENT
Start: 2022-01-04 | End: 2022-01-30

## 2022-01-29 DIAGNOSIS — B00.9 RECURRENT HERPES SIMPLEX: ICD-10-CM

## 2022-01-30 RX ORDER — VALACYCLOVIR HYDROCHLORIDE 500 MG/1
TABLET, FILM COATED ORAL
Qty: 58 TABLET | Refills: 1 | Status: SHIPPED | OUTPATIENT
Start: 2022-01-30 | End: 2022-02-26

## 2022-02-26 DIAGNOSIS — B00.9 RECURRENT HERPES SIMPLEX: ICD-10-CM

## 2022-02-26 RX ORDER — VALACYCLOVIR HYDROCHLORIDE 500 MG/1
TABLET, FILM COATED ORAL
Qty: 60 TABLET | Refills: 1 | Status: SHIPPED | OUTPATIENT
Start: 2022-02-26 | End: 2022-04-13 | Stop reason: SDUPTHER

## 2022-03-18 PROBLEM — F41.9 ANXIETY AND DEPRESSION: Status: ACTIVE | Noted: 2019-12-30

## 2022-03-18 PROBLEM — F32.A ANXIETY AND DEPRESSION: Status: ACTIVE | Noted: 2019-12-30

## 2022-03-21 ENCOUNTER — OFFICE VISIT (OUTPATIENT)
Dept: PRIMARY CARE CLINIC | Age: 22
End: 2022-03-21
Payer: COMMERCIAL

## 2022-03-21 VITALS
HEART RATE: 60 BPM | DIASTOLIC BLOOD PRESSURE: 73 MMHG | HEIGHT: 65 IN | BODY MASS INDEX: 24.22 KG/M2 | SYSTOLIC BLOOD PRESSURE: 118 MMHG | TEMPERATURE: 97.5 F | OXYGEN SATURATION: 95 % | RESPIRATION RATE: 15 BRPM | WEIGHT: 145.4 LBS

## 2022-03-21 DIAGNOSIS — F41.9 ANXIETY AND DEPRESSION: Primary | ICD-10-CM

## 2022-03-21 DIAGNOSIS — Z12.4 CERVICAL CANCER SCREENING: ICD-10-CM

## 2022-03-21 DIAGNOSIS — F32.A ANXIETY AND DEPRESSION: Primary | ICD-10-CM

## 2022-03-21 PROCEDURE — 99213 OFFICE O/P EST LOW 20 MIN: CPT | Performed by: INTERNAL MEDICINE

## 2022-03-21 RX ORDER — BISMUTH SUBSALICYLATE 262 MG
1 TABLET,CHEWABLE ORAL DAILY
COMMUNITY

## 2022-03-21 RX ORDER — SERTRALINE HYDROCHLORIDE 50 MG/1
50 TABLET, FILM COATED ORAL DAILY
Qty: 90 TABLET | Refills: 1 | Status: SHIPPED | OUTPATIENT
Start: 2022-03-21

## 2022-03-21 NOTE — PROGRESS NOTES
Dalton Vang (: 2000) is a 19 y.o. female, established patient, here for evaluation of the following chief complaint(s):  Gyn Exam (wanting an STD screening)     Written by Franklyn Chapman, as dictated by Dr. Shanell Quesada MD.      ASSESSMENT/PLAN:  Below is the assessment and plan developed based on review of pertinent history, physical exam, labs, studies, and medications. 1. Anxiety and depression  Well controlled on sertraline 50 mg daily. Continue on current medication(s) which I refilled today. -     sertraline (ZOLOFT) 50 mg tablet; Take 1 Tablet by mouth daily. , Normal, Disp-90 Tablet, R-1 sent to pharmacy. 2. Cervical cancer screening  Pap smear done today in office. She tolerated this well. Ordered STD screening.   -     PAP IG, APTIMA HPV AND RFX 16/18,45 ([id number]); Future  -     CT+NG+M GENITALIUM BY MICHELLE, UR; Future    SUBJECTIVE/OBJECTIVE:  HPI   The patient presents today for a routine follow-up and well woman exam. 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 She is on sertraline 50 mg daily for anxiety which has been working well. She is on Tri-Lo-Anastacia for birth control. She has a new boyfriend and requesting to be tested for STDs. Patient Active Problem List   Diagnosis Code    Anxiety and depression F41.9, F32. A        Current Outpatient Medications on File Prior to Visit   Medication Sig Dispense Refill    multivitamin (ONE A DAY) tablet Take 1 Tablet by mouth daily.  valACYclovir (VALTREX) 500 mg tablet TAKE 1 TABLET BY MOUTH TWICE A DAY 60 Tablet 1    Tri-Lo-Anastacia 0.18/0.215/0.25 mg-25 mcg tab TAKE 1 TABLET BY MOUTH EVERY DAY 84 Tablet 1    [DISCONTINUED] omeprazole (PRILOSEC) 40 mg capsule Take 1 Capsule by mouth daily. 90 Capsule 0    [DISCONTINUED] sertraline (ZOLOFT) 50 mg tablet Take 1 Tablet by mouth daily. 90 Tablet 1     No current facility-administered medications on file prior to visit.        No Known Allergies    Past Medical History:   Diagnosis Date    History of IBS        Past Surgical History:   Procedure Laterality Date    [place] RF TONGUE BASE VOL REDUXN      Age 10       Family History   Problem Relation Age of Onset    No Known Problems Mother     No Known Problems Father     No Known Problems Sister     No Known Problems Brother         3 half brothers       Social History     Socioeconomic History    Marital status: SINGLE     Spouse name: Not on file    Number of children: Not on file    Years of education: Not on file    Highest education level: Not on file   Occupational History    Not on file   Tobacco Use    Smoking status: Never Smoker    Smokeless tobacco: Never Used   Substance and Sexual Activity    Alcohol use: Yes     Alcohol/week: 3.0 standard drinks     Types: 1 Glasses of wine, 1 Cans of beer, 1 Shots of liquor per week     Comment: 2 x weekly    Drug use: No    Sexual activity: Yes     Partners: Male     Birth control/protection: Pill   Other Topics Concern    Not on file   Social History Narrative    Not on file       No visits with results within 3 Month(s) from this visit. Latest known visit with results is:   Office Visit on 09/07/2021   Component Date Value Ref Range Status    Diagnosis 09/07/2021 Comment*  Final    Comment: EPITHELIAL CELL ABNORMALITY. ATYPICAL SQUAMOUS CELLS OF UNDETERMINED SIGNIFICANCE (ASC-US).  Specimen adequacy 09/07/2021 Comment   Final    Comment: Satisfactory for evaluation. Endocervical and/or squamous metaplastic  cells (endocervical component) are present.  Clinician provided ICD10 09/07/2021 Comment   Final    Z01.419    Performed by: 09/07/2021 Comment   Final    Sylvia Costa, Cytotechnologist (ASCP)    Electronically signed by: 09/07/2021 Comment   Final    Tigist Michael MD, Pathologist    . 09/07/2021 .    Final    Pathologist provided ICD10 09/07/2021 Comment   Final    R87.610    Note: 09/07/2021 Comment   Final    Comment: The Pap smear is a screening test designed to aid in the detection of  premalignant and malignant conditions of the uterine cervix. It is not a  diagnostic procedure and should not be used as the sole means of detecting  cervical cancer. Both false-positive and false-negative reports do occur.  Test methodology 09/07/2021 Comment   Final    Comment: This liquid based ThinPrep(R) pap test was screened with the  use of an image guided system.  HPV APTIMA 09/07/2021 Negative  Negative Final    Comment: This nucleic acid amplification test detects fourteen high-risk  HPV types (16,18,31,33,35,39,45,51,52,56,58,59,66,68) without  differentiation. Review of Systems   Constitutional: Negative for activity change, fatigue and unexpected weight change. HENT: Negative for congestion, hearing loss, rhinorrhea and sore throat. Eyes: Negative for discharge. Respiratory: Negative for cough, chest tightness and shortness of breath. Cardiovascular: Negative for leg swelling. Gastrointestinal: Negative for abdominal pain, constipation and diarrhea. Genitourinary: Negative for dysuria, flank pain, frequency and urgency. Musculoskeletal: Negative for arthralgias, back pain and myalgias. Skin: Negative for color change and rash. Neurological: Negative for dizziness, light-headedness and headaches. Psychiatric/Behavioral: Negative for dysphoric mood and sleep disturbance. The patient is not nervous/anxious. Visit Vitals  /73 (BP 1 Location: Right arm)   Pulse 60   Temp 97.5 °F (36.4 °C)   Resp 15   Ht 5' 5\" (1.651 m)   Wt 145 lb 6.4 oz (66 kg)   LMP 03/07/2022 (Approximate)   SpO2 95%   BMI 24.20 kg/m²      Physical Exam  Vitals and nursing note reviewed. Constitutional:       General: She is not in acute distress. Appearance: Normal appearance. She is well-developed. She is not diaphoretic.    HENT:      Right Ear: External ear normal.      Left Ear: External ear normal.   Eyes:      General: No scleral icterus. Right eye: No discharge. Left eye: No discharge. Extraocular Movements: Extraocular movements intact. Conjunctiva/sclera: Conjunctivae normal.   Cardiovascular:      Rate and Rhythm: Normal rate and regular rhythm. Pulmonary:      Effort: Pulmonary effort is normal.      Breath sounds: Normal breath sounds. No wheezing. Chest:      Comments: BREAST EXAM: breasts appear normal, no suspicious masses, no skin or nipple changes or axillary nodes   Abdominal:      General: Bowel sounds are normal.      Palpations: Abdomen is soft. Tenderness: There is no abdominal tenderness. Genitourinary:     Comments: normal external genitalia, vulva, vagina, and cervix   Musculoskeletal:      Cervical back: Normal range of motion and neck supple. Lymphadenopathy:      Cervical: No cervical adenopathy. Neurological:      Mental Status: She is alert and oriented to person, place, and time. Psychiatric:         Mood and Affect: Mood and affect normal.       An electronic signature was used to authenticate this note.   -- Krzysztof Buckley

## 2022-03-21 NOTE — PROGRESS NOTES
Chief Complaint   Patient presents with   [facility] [employer] Exam     wanting an STD screening       Visit Vitals  /73 (BP 1 Location: Right arm)   Pulse 60   Temp 97.5 °F (36.4 °C)   Resp 15   Ht 5' 5\" (1.651 m)   Wt 145 lb 6.4 oz (66 kg)   LMP 03/07/2022 (Approximate)   SpO2 95%   BMI 24.20 kg/m²       1. Have you been to the ER, urgent care clinic since your last visit? Hospitalized since your last visit? No    2. Have you seen or consulted any other health care providers outside of the [address] [place] [address] since your last visit? Include any pap smears or colon screening.  No

## 2022-03-24 LAB
C TRACH RRNA UR QL NAA+PROBE: NEGATIVE
MYCOPLASMA GENITALIUM NAA, 180024: NEGATIVE
N GONORRHOEA RRNA UR QL NAA+PROBE: NEGATIVE

## 2022-03-25 LAB
CYTOLOGIST CVX/VAG CYTO: NORMAL
CYTOLOGY CVX/VAG DOC CYTO: NORMAL
CYTOLOGY CVX/VAG DOC THIN PREP: NORMAL
DX ICD CODE: NORMAL
HPV I/H RISK 4 DNA CVX QL PROBE+SIG AMP: NEGATIVE
Lab: NORMAL
OTHER STN SPEC: NORMAL
STAT OF ADQ CVX/VAG CYTO-IMP: NORMAL

## 2022-03-25 NOTE — PROGRESS NOTES
Results reviewed. Release via [employer] hope you are doing well. Your STD result came back fine . Still waiting for pap result/

## 2022-04-13 DIAGNOSIS — B00.9 RECURRENT HERPES SIMPLEX: ICD-10-CM

## 2022-04-14 RX ORDER — VALACYCLOVIR HYDROCHLORIDE 500 MG/1
500 TABLET, FILM COATED ORAL 2 TIMES DAILY
Qty: 60 TABLET | Refills: 1 | Status: SHIPPED | OUTPATIENT
Start: 2022-04-14 | End: 2022-05-17 | Stop reason: SDUPTHER

## 2022-05-17 ENCOUNTER — PATIENT MESSAGE (OUTPATIENT)
Dept: PRIMARY CARE CLINIC | Age: 22
End: 2022-05-17

## 2022-05-17 DIAGNOSIS — F32.A ANXIETY AND DEPRESSION: ICD-10-CM

## 2022-05-17 DIAGNOSIS — B00.9 RECURRENT HERPES SIMPLEX: ICD-10-CM

## 2022-05-17 DIAGNOSIS — F41.9 ANXIETY AND DEPRESSION: ICD-10-CM

## 2022-05-17 RX ORDER — VALACYCLOVIR HYDROCHLORIDE 500 MG/1
500 TABLET, FILM COATED ORAL 2 TIMES DAILY
Qty: 180 TABLET | Refills: 0 | Status: SHIPPED | OUTPATIENT
Start: 2022-05-17 | End: 2022-08-15

## 2022-05-17 NOTE — TELEPHONE ENCOUNTER
From: Blossom Graves  To: Jose Maria Johnson MD  Sent: 5/17/2022 12:11 PM EDT  Subject: please send valtrex prescription    Hello! [place] about [place] go [place]. I need to bring enough of my prescriptions with me. Will you please send my [facility]trex prescription to the [facility] listed. Thanks !

## 2022-06-14 DIAGNOSIS — Z78.9 USES BIRTH CONTROL: ICD-10-CM

## 2022-06-14 RX ORDER — NORGESTIMATE AND ETHINYL ESTRADIOL 7DAYSX3 LO
KIT ORAL
Qty: 84 TABLET | Refills: 1 | Status: SHIPPED | OUTPATIENT
Start: 2022-06-14

## 2022-08-21 RX ORDER — VALACYCLOVIR HYDROCHLORIDE 500 MG/1
TABLET, FILM COATED ORAL
Qty: 60 TABLET | Refills: 2 | Status: SHIPPED | OUTPATIENT
Start: 2022-08-21

## 2022-09-16 ENCOUNTER — NURSE TRIAGE (OUTPATIENT)
Dept: OTHER | Facility: CLINIC | Age: 22
End: 2022-09-16

## 2022-09-16 NOTE — TELEPHONE ENCOUNTER
Received call from Tessa Alejandro at [facility] with Red Flag Complaint. Subjective: Caller states \"suspects food poisoning\"     Current Symptoms:   Started an hour after she ate  Vomiting, diarrhea  No blood in the first few hours  Was able to sleep for a couple hours  Woke up and had stomach cramps  Started passing mucous and red blood  Five episodes of bloody stool now    Onset:  last night    Associated Symptoms: NA    Pain Severity: 3/10; cramping; constant, worse prior to going to the bathroom    Temperature: denies     What has been tried: pepto bismol    LMP: NA Pregnant: NA    Recommended disposition: Go to ED Now    Care advice provided, patient verbalizes understanding; denies any other questions or concerns; instructed to call back for any new or worsening symptoms. Patient/caller agrees to proceed to nearest Emergency Department. Attention Provider: Thank you for allowing me to participate in the care of your patient. The patient was connected to triage in response to information provided to the [facility]. Please do not respond through this encounter as the response is not directed to a shared pool.     Reason for Disposition   Bloody, black, or tarry bowel movements  (Exception: Chronic-unchanged black-grey bowel movements and is taking iron pills or Pepto-Bismol.)    Protocols used: Rectal Bleeding-ADULT-[place]

## 2022-11-09 RX ORDER — VALACYCLOVIR HYDROCHLORIDE 500 MG/1
TABLET, FILM COATED ORAL
Qty: 60 TABLET | Refills: 2 | Status: SHIPPED | OUTPATIENT
Start: 2022-11-09

## 2022-12-07 DIAGNOSIS — Z78.9 USES BIRTH CONTROL: ICD-10-CM

## 2022-12-07 RX ORDER — NORGESTIMATE AND ETHINYL ESTRADIOL 7DAYSX3 LO
KIT ORAL
Qty: 84 TABLET | Refills: 1 | Status: SHIPPED | OUTPATIENT
Start: 2022-12-07

## 2022-12-30 ENCOUNTER — PATIENT MESSAGE (OUTPATIENT)
Dept: PRIMARY CARE CLINIC | Age: 22
End: 2022-12-30

## 2023-01-04 NOTE — TELEPHONE ENCOUNTER
Spoke to pt and told her Dr. Moriah Major is sick and we have to re-schedule her nereyda for today. I asked if we can re-schedule her for the week of the 16th and she said she is going out of town that week and I asked when she will be back and she said she is going back to school. I said Dr. Moriah Major will probably be out most of next week as well. I said let me ask one of the nurse practitioners if they can see you. I asked Joe Bean and Vikash Millan and Jo said Monday 1/09/2023 at 10:40am. I called pt back and spoke to her and I asked if she can come in 1/09 at 10:40am with Jo the nurse practitioner and she said yes. She is re-scheduled.

## 2023-01-29 RX ORDER — VALACYCLOVIR HYDROCHLORIDE 500 MG/1
TABLET, FILM COATED ORAL
Qty: 60 TABLET | Refills: 2 | Status: SHIPPED | OUTPATIENT
Start: 2023-01-29

## 2023-04-25 ENCOUNTER — OFFICE VISIT (OUTPATIENT)
Dept: PRIMARY CARE CLINIC | Age: 23
End: 2023-04-25
Payer: COMMERCIAL

## 2023-04-25 VITALS
HEART RATE: 69 BPM | SYSTOLIC BLOOD PRESSURE: 107 MMHG | OXYGEN SATURATION: 98 % | BODY MASS INDEX: 22.92 KG/M2 | HEIGHT: 65 IN | RESPIRATION RATE: 16 BRPM | TEMPERATURE: 97.5 F | DIASTOLIC BLOOD PRESSURE: 67 MMHG | WEIGHT: 137.6 LBS

## 2023-04-25 DIAGNOSIS — B00.9 RECURRENT HERPES SIMPLEX: ICD-10-CM

## 2023-04-25 DIAGNOSIS — Z78.9 USES BIRTH CONTROL: ICD-10-CM

## 2023-04-25 DIAGNOSIS — Z00.00 PHYSICAL EXAM: Primary | ICD-10-CM

## 2023-04-25 DIAGNOSIS — F41.9 ANXIETY AND DEPRESSION: ICD-10-CM

## 2023-04-25 DIAGNOSIS — F32.A ANXIETY AND DEPRESSION: ICD-10-CM

## 2023-04-25 PROCEDURE — 99395 PREV VISIT EST AGE 18-39: CPT | Performed by: INTERNAL MEDICINE

## 2023-04-25 RX ORDER — NORGESTIMATE AND ETHINYL ESTRADIOL 7DAYSX3 LO
1 KIT ORAL DAILY
Qty: 84 TABLET | Refills: 2 | Status: SHIPPED | OUTPATIENT
Start: 2023-04-25

## 2023-04-25 RX ORDER — SERTRALINE HYDROCHLORIDE 50 MG/1
50 TABLET, FILM COATED ORAL DAILY
Qty: 90 TABLET | Refills: 0 | Status: SHIPPED | OUTPATIENT
Start: 2023-04-25 | End: 2023-07-24

## 2023-04-25 NOTE — PROGRESS NOTES
1. \"Have you been to the ER, urgent care clinic since your last visit? Hospitalized since your last visit? \" No    2. \"Have you seen or consulted any other health care providers outside of the [address] [place] [address] since your last visit? \" Yes When: Doctor in [facility]       3. For patients aged 39-70: Has the patient had a colonoscopy / FIT/ Cologuard? NA - based on age      If the patient is female:    4. For patients aged 41-77: Has the patient had a mammogram within the past 2 years? NA - based on age or sex      11. For patients aged 21-65: Has the patient had a pap smear?  Yes - no Care Gap present       Chief Complaint   Patient presents with    Physical

## 2023-04-25 NOTE — PROGRESS NOTES
Marlys Anne (: 2000) is a 23 y.o. female, established patient, here for evaluation of the following chief complaint(s):  No chief complaint on file. ASSESSMENT/PLAN:  Below is the assessment and plan developed based on review of pertinent history, physical exam, labs, studies, and medications. {There are no diagnoses linked to this encounter. (Refresh or delete this SmartLink)}    No follow-ups on file. SUBJECTIVE/OBJECTIVE:  HPI  Patient presents today for an office visit and a physical.***                  Patient Active Problem List   Diagnosis Code    Anxiety and depression F41.9, F32. A        Current Outpatient Medications on File Prior to Visit   Medication Sig Dispense Refill    sertraline (ZOLOFT) 50 mg tablet Take 1 Tablet by mouth daily for 30 days. 30 Tablet 0    sertraline (ZOLOFT) 50 mg tablet Take 1 Tablet by mouth daily. 30 Tablet 0    valACYclovir (VALTREX) 500 mg tablet TAKE 1 TABLET BY MOUTH TWO TIMES A DAY FOR 90 DAYS. 60 Tablet 2    Tri-Lo-Anastacia 0.18/0.215/0.25 mg-25 mcg tab TAKE 1 TABLET BY MOUTH EVERY DAY 84 Tablet 1    multivitamin (ONE A DAY) tablet Take 1 Tablet by mouth daily. No current facility-administered medications on file prior to visit.        No Known Allergies    Past Medical History:   Diagnosis Date    History of IBS        Past Surgical History:   Procedure Laterality Date    [place] RF TONGUE BASE VOL REDUXN      Age 10       Family History   Problem Relation Age of Onset    No Known Problems Mother     No Known Problems Father     No Known Problems Sister     No Known Problems Brother         3 half brothers       Social History     Socioeconomic History    Marital status: SINGLE     Spouse name: Not on file    Number of children: Not on file    Years of education: Not on file    Highest education level: Not on file   Occupational History    Not on file   Tobacco Use    Smoking status: Never    Smokeless tobacco: Never   Vaping Use    Vaping Use: Never used   Substance and Sexual Activity    Alcohol use: Yes     Alcohol/week: 3.0 standard drinks     Types: 1 Glasses of wine, 1 Cans of beer, 1 Shots of liquor per week     Comment: 2 x weekly    Drug use: No    Sexual activity: Yes     Partners: Male     Birth control/protection: Pill   Other Topics Concern    Not on file   Social History Narrative    Not on file     Social Determinants of Health     Financial Resource Strain: Not on file   Food Insecurity: Not on file   Transportation Needs: Not on file   Physical Activity: Not on file   Stress: Not on file   Social Connections: Not on file   Intimate Partner Violence: Not on file   Housing Stability: Not on file       No visits with results within 3 Month(s) from this visit. Latest known visit with results is:   Office Visit on 03/21/2022   Component Date Value Ref Range Status    Diagnosis 03/21/2022 Comment   Final    NEGATIVE FOR INTRAEPITHELIAL LESION OR MALIGNANCY. Specimen adequacy 03/21/2022 Comment   Final    Satisfactory for evaluation. No endocervical component is identified. Clinician provided ICD10 03/21/2022 Comment   Final    Z12.4    Performed by: 03/21/2022 Comment   Final    Khang Barry Cytotechnologist (ASCP)    . 03/21/2022 . Final    Note: 03/21/2022 Comment   Final    Comment: The Pap smear is a screening test designed to aid in the detection of  premalignant and malignant conditions of the uterine cervix. It is not a  diagnostic procedure and should not be used as the sole means of detecting  cervical cancer. Both false-positive and false-negative reports do occur. Test methodology 03/21/2022 Comment   Final    Comment: This liquid based ThinPrep(R) pap test was screened with the  use of an image guided system. HPV APTIMA 03/21/2022 Negative  Negative Final    Comment:  This nucleic acid amplification test detects fourteen high-risk  HPV types (16,18,31,33,35,39,45,51,52,56,58,59,66,68) without  differentiation. M. genitalium by MICHELLE 03/21/2022 Negative  Negative Final    C. trachomatis by MICHELLE 03/21/2022 Negative  Negative Final    N. gonorrhoeae by MICHELLE 03/21/2022 Negative  Negative Final       Review of Systems   Constitutional:  Negative for activity change, fatigue and unexpected weight change. HENT:  Negative for congestion, hearing loss, rhinorrhea and sore throat. Eyes:  Negative for discharge. Respiratory:  Negative for cough, chest tightness and shortness of breath. Cardiovascular:  Negative for leg swelling. Gastrointestinal:  Negative for abdominal pain, constipation and diarrhea. Genitourinary:  Negative for dysuria, flank pain, frequency and urgency. Musculoskeletal:  Negative for arthralgias, back pain and myalgias. Skin:  Negative for color change and rash. Neurological:  Negative for dizziness, light-headedness and headaches. Psychiatric/Behavioral:  Negative for dysphoric mood and sleep disturbance. The patient is not nervous/anxious. There were no vitals taken for this visit. Physical Exam  Vitals and nursing note reviewed. Constitutional:       General: She is not in acute distress. Appearance: Normal appearance. She is normal weight. She is not diaphoretic. HENT:      Right Ear: Tympanic membrane, ear canal and external ear normal.      Left Ear: Tympanic membrane, ear canal and external ear normal.      Mouth/Throat:      Mouth: Mucous membranes are moist.      Pharynx: Oropharynx is clear. Eyes:      General:         Right eye: No discharge. Left eye: No discharge. Extraocular Movements: Extraocular movements intact. Conjunctiva/sclera: Conjunctivae normal.   Neck:      Thyroid: No thyromegaly. Cardiovascular:      Rate and Rhythm: Normal rate and regular rhythm. Pulses: Normal pulses. Dorsalis pedis pulses are 2+ on the right side and 2+ on the left side.    Pulmonary:      Effort: Pulmonary effort is normal.      Breath sounds: Normal breath sounds. No wheezing. Abdominal:      General: Bowel sounds are normal. There is no distension. Palpations: Abdomen is soft. Tenderness: There is no abdominal tenderness. Musculoskeletal:      Right lower leg: No edema. Left lower leg: No edema. Comments: B/L knees without crepitus   Lymphadenopathy:      Cervical: No cervical adenopathy. Neurological:      Deep Tendon Reflexes:      Reflex Scores:       Patellar reflexes are 2+ on the right side and 2+ on the left side. Psychiatric:         Mood and Affect: Mood and affect normal.         I, Ynes Gandhi MD, personally performed the services described in this documentation as scribed by Natalie Gibbons in my presence, and it is both accurate and complete. An electronic signature was used to authenticate this note.   -- Natalie Gibbons

## 2023-04-25 NOTE — PROGRESS NOTES
Subjective:   23 y.o. female for Well Woman Check. Patient's last menstrual period was 04/20/2023. Pap smear last year was normal.     Social History: single partner, contraception - condoms. Pertinent past medical hstory: no history of HTN, DVT, CAD, DM, liver disease, migraines or smoking. Patient Active Problem List   Diagnosis Code    Anxiety and depression F41.9, F32. A     Current Outpatient Medications   Medication Sig Dispense Refill    valACYclovir (VALTREX) 500 mg tablet TAKE 1 TABLET BY MOUTH TWO TIMES A DAY FOR 90 DAYS. 30 Tablet 0    sertraline (ZOLOFT) 50 mg tablet Take 1 Tablet by mouth daily for 90 days. 90 Tablet 0    Tri-Lo-Anastacia 0.18/0.215/0.25 mg-25 mcg tab TAKE 1 TABLET BY MOUTH EVERY DAY 84 Tablet 1    multivitamin (ONE A DAY) tablet Take 1 Tablet by mouth daily. No Known Allergies  Past Medical History:   Diagnosis Date    History of IBS      Past Surgical History:   Procedure Laterality Date    [place] RF TONGUE BASE VOL REDUXN      Age 10     Family History   Problem Relation Age of Onset    No Known Problems Mother     No Known Problems Father     No Known Problems Sister     No Known Problems Brother         3 half brothers     Social History     Tobacco Use    Smoking status: Never    Smokeless tobacco: Never   Substance Use Topics    Alcohol use: Yes     Alcohol/week: 3.0 standard drinks     Types: 1 Glasses of wine, 1 Cans of beer, 1 Shots of liquor per week     Comment: 2 x weekly        ROS:  Feeling well. No dyspnea or chest pain on exertion. No abdominal pain, change in bowel habits, black or bloody stools. No urinary tract symptoms. GYN ROS: normal menses, no abnormal bleeding, pelvic pain or discharge, no breast pain or new or enlarging lumps on self exam. No neurological complaints.     Objective:   Visit Vitals  /67 (BP 1 Location: Right upper arm, BP Patient Position: Sitting)   Pulse 69   Temp 97.5 °F (36.4 °C) (Temporal)   Resp 16   Ht 5' 5\" (1.651 m)   Wt 137 lb 9.6 oz (62.4 kg)   LMP 04/20/2023   SpO2 98%   BMI 22.90 kg/m²     The patient appears well, alert, oriented x 3, in no distress. ENT normal.  Neck supple. No adenopathy or thyromegaly. MICHAEL. Lungs are clear, good air entry, no wheezes, rhonchi or rales. S1 and S2 normal, no murmurs, regular rate and rhythm. Abdomen soft without tenderness, guarding, mass or organomegaly. Extremities show no edema, normal peripheral pulses. Neurological is normal, no focal findings. BREAST EXAM: breasts appear normal, no suspicious masses, no skin or nipple changes or axillary nodes      Assessment/Plan:   well woman  return annually or prn    ICD-10-CM ICD-9-CM    1. Physical exam  Z00.00 V70.9 TSH 3RD GENERATION      METABOLIC PANEL, COMPREHENSIVE      CBC W/O DIFF      ]Complete physical exam done                   2. Anxiety and depression  F41.9 300.00 sertraline (ZOLOFT) 50 mg tablet. Doing well. F32. A 311       3. Recurrent herpes simplex  B00.9 054.9 On Valtrex 500 mg  daily. Ana Deutsch

## 2023-04-26 LAB
ALBUMIN SERPL-MCNC: 4.7 G/DL (ref 3.9–5)
ALBUMIN/GLOB SERPL: 1.6 {RATIO} (ref 1.2–2.2)
ALP SERPL-CCNC: 51 IU/L (ref 44–121)
ALT SERPL-CCNC: 13 IU/L (ref 0–32)
AST SERPL-CCNC: 21 IU/L (ref 0–40)
BILIRUB SERPL-MCNC: 0.5 MG/DL (ref 0–1.2)
BUN SERPL-MCNC: 10 MG/DL (ref 6–20)
BUN/CREAT SERPL: 14 (ref 9–23)
CALCIUM SERPL-MCNC: 10 MG/DL (ref 8.7–10.2)
CHLORIDE SERPL-SCNC: 102 MMOL/L (ref 96–106)
CO2 SERPL-SCNC: 26 MMOL/L (ref 20–29)
CREAT SERPL-MCNC: 0.72 MG/DL (ref 0.57–1)
EGFRCR SERPLBLD CKD-EPI 2021: 120 ML/MIN/1.73
ERYTHROCYTE [DISTWIDTH] IN BLOOD BY AUTOMATED COUNT: 11.5 % (ref 11.7–15.4)
GLOBULIN SER CALC-MCNC: 3 G/DL (ref 1.5–4.5)
GLUCOSE SERPL-MCNC: 81 MG/DL (ref 70–99)
HCT VFR BLD AUTO: 43.3 % (ref 34–46.6)
HGB BLD-MCNC: 14.8 G/DL (ref 11.1–15.9)
MCH RBC QN AUTO: 33.4 PG (ref 26.6–33)
MCHC RBC AUTO-ENTMCNC: 34.2 G/DL (ref 31.5–35.7)
MCV RBC AUTO: 98 FL (ref 79–97)
PLATELET # BLD AUTO: 284 X10E3/UL (ref 150–450)
POTASSIUM SERPL-SCNC: 4.7 MMOL/L (ref 3.5–5.2)
PROT SERPL-MCNC: 7.7 G/DL (ref 6–8.5)
RBC # BLD AUTO: 4.43 X10E6/UL (ref 3.77–5.28)
SODIUM SERPL-SCNC: 142 MMOL/L (ref 134–144)
TSH SERPL DL<=0.005 MIU/L-ACNC: 1.23 UIU/ML (ref 0.45–4.5)
WBC # BLD AUTO: 5.5 X10E3/UL (ref 3.4–10.8)

## 2023-04-27 NOTE — PROGRESS NOTES
Results reviewed. Release via [facility], your blood report is back and overall numbers look good. I would recommend taking Vitamin B12 1000 mcg daily dose as MCV number came little high.

## 2023-05-03 RX ORDER — VALACYCLOVIR HYDROCHLORIDE 500 MG/1
TABLET, FILM COATED ORAL
Qty: 30 TABLET | Refills: 0 | Status: SHIPPED | OUTPATIENT
Start: 2023-05-03

## 2023-05-17 DIAGNOSIS — B00.9 HERPESVIRAL INFECTION, UNSPECIFIED: Primary | ICD-10-CM

## 2023-05-17 RX ORDER — VALACYCLOVIR HYDROCHLORIDE 500 MG/1
500 TABLET, FILM COATED ORAL 2 TIMES DAILY
Qty: 180 TABLET | Refills: 0 | Status: SHIPPED | OUTPATIENT
Start: 2023-05-17 | End: 2023-08-15

## 2023-05-23 RX ORDER — VALACYCLOVIR HYDROCHLORIDE 500 MG/1
TABLET, FILM COATED ORAL
COMMUNITY
Start: 2023-05-03

## 2023-05-23 RX ORDER — NORGESTIMATE AND ETHINYL ESTRADIOL 7DAYSX3 LO
1 KIT ORAL DAILY
COMMUNITY
Start: 2023-04-25

## 2023-08-02 ENCOUNTER — PATIENT MESSAGE (OUTPATIENT)
Dept: PRIMARY CARE CLINIC | Facility: CLINIC | Age: 23
End: 2023-08-02

## 2023-08-02 DIAGNOSIS — F32.A ANXIETY AND DEPRESSION: Primary | ICD-10-CM

## 2023-08-02 DIAGNOSIS — F41.9 ANXIETY AND DEPRESSION: Primary | ICD-10-CM

## 2023-08-02 DIAGNOSIS — F41.9 ANXIETY DISORDER, UNSPECIFIED: ICD-10-CM

## 2023-08-02 NOTE — TELEPHONE ENCOUNTER
From: Mel Mao  To: Dr. Rachid Anna: 8/2/2023 2:56 PM EDT  Subject: Zoloft refill    Hello! I am about to run out of my zoloft. Please send the refill asap to the   [address] Thanks !

## 2023-08-05 DIAGNOSIS — B00.9 HERPESVIRAL INFECTION, UNSPECIFIED: ICD-10-CM

## 2023-08-06 RX ORDER — VALACYCLOVIR HYDROCHLORIDE 500 MG/1
TABLET, FILM COATED ORAL
Qty: 180 TABLET | Refills: 0 | Status: SHIPPED | OUTPATIENT
Start: 2023-08-06

## 2023-08-25 DIAGNOSIS — B00.9 HERPESVIRAL INFECTION, UNSPECIFIED: ICD-10-CM

## 2023-08-25 RX ORDER — VALACYCLOVIR HYDROCHLORIDE 500 MG/1
500 TABLET, FILM COATED ORAL 2 TIMES DAILY
Qty: 30 TABLET | Refills: 1 | Status: SHIPPED | OUTPATIENT
Start: 2023-08-25 | End: 2023-09-25 | Stop reason: SDUPTHER

## 2023-09-06 DIAGNOSIS — B00.9 HERPESVIRAL INFECTION, UNSPECIFIED: ICD-10-CM

## 2023-09-06 RX ORDER — VALACYCLOVIR HYDROCHLORIDE 500 MG/1
TABLET, FILM COATED ORAL
Qty: 30 TABLET | Refills: 1 | OUTPATIENT
Start: 2023-09-06

## 2023-09-25 DIAGNOSIS — B00.9 HERPESVIRAL INFECTION, UNSPECIFIED: ICD-10-CM

## 2023-09-25 RX ORDER — VALACYCLOVIR HYDROCHLORIDE 500 MG/1
500 TABLET, FILM COATED ORAL 2 TIMES DAILY
Qty: 30 TABLET | Refills: 1 | Status: SHIPPED | OUTPATIENT
Start: 2023-09-25

## 2023-10-01 NOTE — TELEPHONE ENCOUNTER
----- Message from Chidi Hummel sent at 10/1/2021 12:39 PM EDT -----  Regarding: MD Tomas/ telephone    Patient's first and last name:  Bryant Boykin  : 2000    Caller's first and last name:  pt    Reason for call: Pap results    Callback required yes/no and why: yes    Best contact number(s):  [phone]    Details to clarify the request:  Pt is requesting call to discuss Pap results from 21. Please advise.
Called patient- no answer, could not leave voice message due to mailbox being full
Bhutanese

## 2023-10-22 DIAGNOSIS — F41.9 ANXIETY AND DEPRESSION: ICD-10-CM

## 2023-10-22 DIAGNOSIS — F32.A ANXIETY AND DEPRESSION: ICD-10-CM

## 2023-12-07 DIAGNOSIS — B00.9 HERPESVIRAL INFECTION, UNSPECIFIED: ICD-10-CM

## 2023-12-07 RX ORDER — VALACYCLOVIR HYDROCHLORIDE 500 MG/1
500 TABLET, FILM COATED ORAL 2 TIMES DAILY
Qty: 30 TABLET | Refills: 2 | Status: SHIPPED | OUTPATIENT
Start: 2023-12-07

## 2024-01-04 DIAGNOSIS — Z78.9 OTHER SPECIFIED HEALTH STATUS: ICD-10-CM

## 2024-01-04 RX ORDER — NORGESTIMATE AND ETHINYL ESTRADIOL
1 KIT DAILY
Qty: 84 TABLET | Refills: 2 | Status: SHIPPED | OUTPATIENT
Start: 2024-01-04

## 2024-01-16 DIAGNOSIS — F41.9 ANXIETY AND DEPRESSION: ICD-10-CM

## 2024-01-16 DIAGNOSIS — B00.9 HERPESVIRAL INFECTION, UNSPECIFIED: ICD-10-CM

## 2024-01-16 DIAGNOSIS — F32.A ANXIETY AND DEPRESSION: ICD-10-CM

## 2024-01-16 RX ORDER — VALACYCLOVIR HYDROCHLORIDE 500 MG/1
500 TABLET, FILM COATED ORAL 2 TIMES DAILY
Qty: 30 TABLET | Refills: 2 | Status: SHIPPED | OUTPATIENT
Start: 2024-01-16

## 2024-01-29 DIAGNOSIS — B00.9 HERPESVIRAL INFECTION, UNSPECIFIED: ICD-10-CM

## 2024-01-29 RX ORDER — VALACYCLOVIR HYDROCHLORIDE 500 MG/1
500 TABLET, FILM COATED ORAL 2 TIMES DAILY
Qty: 60 TABLET | Refills: 2 | Status: SHIPPED | OUTPATIENT
Start: 2024-01-29 | End: 2024-04-28

## 2024-03-25 DIAGNOSIS — B00.9 HERPESVIRAL INFECTION, UNSPECIFIED: ICD-10-CM

## 2024-03-25 RX ORDER — VALACYCLOVIR HYDROCHLORIDE 500 MG/1
500 TABLET, FILM COATED ORAL 2 TIMES DAILY
Qty: 60 TABLET | Refills: 2 | Status: SHIPPED | OUTPATIENT
Start: 2024-03-25 | End: 2024-06-23

## 2024-04-22 DIAGNOSIS — F32.A ANXIETY AND DEPRESSION: ICD-10-CM

## 2024-04-22 DIAGNOSIS — F41.9 ANXIETY AND DEPRESSION: ICD-10-CM

## 2024-07-01 ENCOUNTER — OFFICE VISIT (OUTPATIENT)
Dept: PRIMARY CARE CLINIC | Facility: CLINIC | Age: 24
End: 2024-07-01
Payer: COMMERCIAL

## 2024-07-01 VITALS
WEIGHT: 146 LBS | DIASTOLIC BLOOD PRESSURE: 67 MMHG | HEIGHT: 65 IN | HEART RATE: 69 BPM | OXYGEN SATURATION: 98 % | SYSTOLIC BLOOD PRESSURE: 101 MMHG | RESPIRATION RATE: 16 BRPM | TEMPERATURE: 97.5 F | BODY MASS INDEX: 24.32 KG/M2

## 2024-07-01 DIAGNOSIS — Z86.19 HISTORY OF COLD SORES: ICD-10-CM

## 2024-07-01 DIAGNOSIS — F41.9 ANXIETY AND DEPRESSION: ICD-10-CM

## 2024-07-01 DIAGNOSIS — Z11.59 NEED FOR HEPATITIS C SCREENING TEST: ICD-10-CM

## 2024-07-01 DIAGNOSIS — Z00.00 PHYSICAL EXAM: Primary | ICD-10-CM

## 2024-07-01 DIAGNOSIS — Z11.4 ENCOUNTER FOR SCREENING FOR HIV: ICD-10-CM

## 2024-07-01 DIAGNOSIS — Z12.4 CERVICAL CANCER SCREENING: ICD-10-CM

## 2024-07-01 DIAGNOSIS — F45.8 BRUXISM (TEETH GRINDING): ICD-10-CM

## 2024-07-01 DIAGNOSIS — F32.A ANXIETY AND DEPRESSION: ICD-10-CM

## 2024-07-01 PROCEDURE — 99395 PREV VISIT EST AGE 18-39: CPT | Performed by: INTERNAL MEDICINE

## 2024-07-01 RX ORDER — VALACYCLOVIR HYDROCHLORIDE 500 MG/1
500 TABLET, FILM COATED ORAL DAILY
Qty: 30 TABLET | Refills: 2 | Status: SHIPPED | OUTPATIENT
Start: 2024-07-01 | End: 2024-09-29

## 2024-07-01 SDOH — ECONOMIC STABILITY: HOUSING INSECURITY
IN THE LAST 12 MONTHS, WAS THERE A TIME WHEN YOU DID NOT HAVE A STEADY PLACE TO SLEEP OR SLEPT IN A SHELTER (INCLUDING NOW)?: NO

## 2024-07-01 SDOH — ECONOMIC STABILITY: FOOD INSECURITY: WITHIN THE PAST 12 MONTHS, THE FOOD YOU BOUGHT JUST DIDN'T LAST AND YOU DIDN'T HAVE MONEY TO GET MORE.: NEVER TRUE

## 2024-07-01 SDOH — ECONOMIC STABILITY: FOOD INSECURITY: WITHIN THE PAST 12 MONTHS, YOU WORRIED THAT YOUR FOOD WOULD RUN OUT BEFORE YOU GOT MONEY TO BUY MORE.: NEVER TRUE

## 2024-07-01 SDOH — ECONOMIC STABILITY: INCOME INSECURITY: HOW HARD IS IT FOR YOU TO PAY FOR THE VERY BASICS LIKE FOOD, HOUSING, MEDICAL CARE, AND HEATING?: NOT HARD AT ALL

## 2024-07-01 ASSESSMENT — PATIENT HEALTH QUESTIONNAIRE - PHQ9
1. LITTLE INTEREST OR PLEASURE IN DOING THINGS: NOT AT ALL
SUM OF ALL RESPONSES TO PHQ QUESTIONS 1-9: 0
SUM OF ALL RESPONSES TO PHQ QUESTIONS 1-9: 0
2. FEELING DOWN, DEPRESSED OR HOPELESS: NOT AT ALL
SUM OF ALL RESPONSES TO PHQ QUESTIONS 1-9: 0
SUM OF ALL RESPONSES TO PHQ QUESTIONS 1-9: 0
SUM OF ALL RESPONSES TO PHQ9 QUESTIONS 1 & 2: 0

## 2024-07-01 ASSESSMENT — ENCOUNTER SYMPTOMS
BACK PAIN: 0
ABDOMINAL PAIN: 0
DIARRHEA: 0
SORE THROAT: 0
CONSTIPATION: 0
COLOR CHANGE: 0
CHEST TIGHTNESS: 0
EYE DISCHARGE: 0
COUGH: 0
SHORTNESS OF BREATH: 0
RHINORRHEA: 0

## 2024-07-01 NOTE — PROGRESS NOTES
\"Have you been to the ER, urgent care clinic since your last visit?  Hospitalized since your last visit?\"    NO        “Have you seen or consulted any other health care providers outside of [facility] since your last visit?”    NO        Chief Complaint   Patient presents with    Annual Exam       Pt is ok with scribe.

## 2024-07-01 NOTE — PROGRESS NOTES
Shannan Gonzalez (:  2000) is a 23 y.o. female,Established patient, here for evaluation of the following chief complaint(s):  Annual Exam      Assessment & Plan   1. Physical exam  Physical exam normal today. Ordered fasting labs for patient to complete today in office. Waiting on results.     -     CBC; Future  -     Comprehensive Metabolic Panel; Future  -     Lipid Panel; Future  -     TSH; Future    2. Anxiety and depression  Well controlled on Zoloft 50 mg daily. Continue on current medication(s).   -     sertraline (ZOLOFT) 50 MG tablet; Take 1 tablet by mouth daily, Disp-90 tablet, R-1Normal sent to pharmacy.     3. Cervical cancer screening  I provided hr with a referral to Dr. Dior (Ob/Gyn).  -     [facility] - Michelle Dior MD, Ob-Gyn, [place] ([address])    4. Encounter for screening for HIV  Will check HIV antibody labs.  -     HIV 1/2 Ag/Ab, 4TH Generation,W Rflx Confirm; Future    5. Need for hepatitis C screening test  Will check hepatitis C antibodies.   -     Hepatitis C Antibody; Future    6. Bruxism (teeth grinding)  Discussed this may be related to stress. She can discuss getting a mouth guard with her dentist. If this does not improve we can consider trying a muscle relaxer.     7. History of cold sores  Well controlled on Valtrex 500 mg daily. Continue on current medication(s).   -     valACYclovir (VALTREX) 500 MG tablet; Take 1 tablet by mouth daily, Disp-30 tablet, R-2Normal  USPSTF recommendations discussed with her.     Subjective   The patient presents today for her annual physical exam. 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    She is on Zoloft 50 mg daily which has been working well to control her mood.     She notes that she clenches her teeth at night and has noticed \"dents\" in her tongue. She will discuss this with her dentist.    She has noticed that her PMS symptoms have been more severe especially around the time she was finishing up her

## 2024-08-06 ENCOUNTER — OFFICE VISIT (OUTPATIENT)
Age: 24
End: 2024-08-06
Payer: COMMERCIAL

## 2024-08-06 VITALS — DIASTOLIC BLOOD PRESSURE: 72 MMHG | BODY MASS INDEX: 24.4 KG/M2 | SYSTOLIC BLOOD PRESSURE: 114 MMHG | WEIGHT: 146.6 LBS

## 2024-08-06 DIAGNOSIS — Z11.4 ENCOUNTER FOR SCREENING FOR HIV: ICD-10-CM

## 2024-08-06 DIAGNOSIS — Z11.59 NEED FOR HEPATITIS C SCREENING TEST: ICD-10-CM

## 2024-08-06 DIAGNOSIS — Z00.00 PHYSICAL EXAM: ICD-10-CM

## 2024-08-06 DIAGNOSIS — Z78.9 OTHER SPECIFIED HEALTH STATUS: ICD-10-CM

## 2024-08-06 DIAGNOSIS — Z01.419 ENCOUNTER FOR GYNECOLOGICAL EXAMINATION WITHOUT ABNORMAL FINDING: Primary | ICD-10-CM

## 2024-08-06 LAB
ALBUMIN SERPL-MCNC: 4 G/DL (ref 3.5–5)
ALBUMIN/GLOB SERPL: 1.1 (ref 1.1–2.2)
ALP SERPL-CCNC: 48 U/L (ref 45–117)
ALT SERPL-CCNC: 15 U/L (ref 12–78)
ANION GAP SERPL CALC-SCNC: 5 MMOL/L (ref 5–15)
AST SERPL-CCNC: 14 U/L (ref 15–37)
BILIRUB SERPL-MCNC: 0.4 MG/DL (ref 0.2–1)
BUN SERPL-MCNC: 11 MG/DL (ref 6–20)
BUN/CREAT SERPL: 16 (ref 12–20)
CALCIUM SERPL-MCNC: 9.9 MG/DL (ref 8.5–10.1)
CHLORIDE SERPL-SCNC: 104 MMOL/L (ref 97–108)
CHOLEST SERPL-MCNC: 185 MG/DL
CO2 SERPL-SCNC: 29 MMOL/L (ref 21–32)
CREAT SERPL-MCNC: 0.7 MG/DL (ref 0.55–1.02)
ERYTHROCYTE [DISTWIDTH] IN BLOOD BY AUTOMATED COUNT: 11.5 % (ref 11.5–14.5)
GLOBULIN SER CALC-MCNC: 3.6 G/DL (ref 2–4)
GLUCOSE SERPL-MCNC: 96 MG/DL (ref 65–100)
HCT VFR BLD AUTO: 41 % (ref 35–47)
HCV AB SER IA-ACNC: 0.05 INDEX
HCV AB SERPL QL IA: NONREACTIVE
HDLC SERPL-MCNC: 81 MG/DL
HDLC SERPL: 2.3 (ref 0–5)
HGB BLD-MCNC: 13.6 G/DL (ref 11.5–16)
HIV 1+2 AB+HIV1 P24 AG SERPL QL IA: NONREACTIVE
HIV 1/2 RESULT COMMENT: NORMAL
LDLC SERPL CALC-MCNC: 87.2 MG/DL (ref 0–100)
MCH RBC QN AUTO: 32.5 PG (ref 26–34)
MCHC RBC AUTO-ENTMCNC: 33.2 G/DL (ref 30–36.5)
MCV RBC AUTO: 97.9 FL (ref 80–99)
NRBC # BLD: 0 K/UL (ref 0–0.01)
NRBC BLD-RTO: 0 PER 100 WBC
PLATELET # BLD AUTO: 264 K/UL (ref 150–400)
PMV BLD AUTO: 10.7 FL (ref 8.9–12.9)
POTASSIUM SERPL-SCNC: 4 MMOL/L (ref 3.5–5.1)
PROT SERPL-MCNC: 7.6 G/DL (ref 6.4–8.2)
RBC # BLD AUTO: 4.19 M/UL (ref 3.8–5.2)
SODIUM SERPL-SCNC: 138 MMOL/L (ref 136–145)
TRIGL SERPL-MCNC: 84 MG/DL
TSH SERPL DL<=0.05 MIU/L-ACNC: 1.55 UIU/ML (ref 0.36–3.74)
VLDLC SERPL CALC-MCNC: 16.8 MG/DL
WBC # BLD AUTO: 6.2 K/UL (ref 3.6–11)

## 2024-08-06 PROCEDURE — 99385 PREV VISIT NEW AGE 18-39: CPT | Performed by: OBSTETRICS & GYNECOLOGY

## 2024-08-06 RX ORDER — NORGESTIMATE AND ETHINYL ESTRADIOL 7DAYSX3 LO
1 KIT ORAL DAILY
Qty: 84 TABLET | Refills: 4 | Status: SHIPPED | OUTPATIENT
Start: 2024-08-06

## 2024-08-06 NOTE — PROGRESS NOTES
Chief Complaint   Patient presents with    Annual Exam    New Patient     Shannan Gonzalez is a 24yo NEW PATIENT who presents for annual exam.  Her main concerns today include: none    Patient requesting to have PCP ordered labs drawn here in our office    On Valtrex for cold sores    Referred by pcp- Juju Rodriguez MD    Ob/Gyn Hx:  G0  LMP - Patient's last menstrual period was 07/29/2024 (approximate).  Menses - regular, flow 5 days   Contraception - ocps  Hx of STI - Denies  SA - Not currently    Health maintenance:  Last Pap: 03/21/22 NILM/HPV neg, 09/07/21 ASCUS/HPV neg  Gardasil: 3/3    1. Have you been to the ER, urgent care clinic, or hospitalized since your last visit?No    2. Have you seen or consulted any other health care providers outside of the [facility] since your last visit? No    Conchis Stubbs LPN       
today  -continue zoloft for management of anxiety/depression    RTC: for AE    Michelle Dior MD  8/6/2024  8:53 AM

## 2024-09-02 DIAGNOSIS — Z86.19 HISTORY OF COLD SORES: ICD-10-CM

## 2024-09-02 RX ORDER — VALACYCLOVIR HYDROCHLORIDE 500 MG/1
500 TABLET, FILM COATED ORAL DAILY
Qty: 30 TABLET | Refills: 2 | Status: SHIPPED | OUTPATIENT
Start: 2024-09-02 | End: 2024-12-01

## 2024-11-04 DIAGNOSIS — Z86.19 HISTORY OF COLD SORES: ICD-10-CM

## 2024-11-04 RX ORDER — VALACYCLOVIR HYDROCHLORIDE 500 MG/1
500 TABLET, FILM COATED ORAL DAILY
Qty: 30 TABLET | Refills: 2 | Status: SHIPPED | OUTPATIENT
Start: 2024-11-04 | End: 2025-02-02

## 2024-11-29 DIAGNOSIS — Z86.19 HISTORY OF COLD SORES: ICD-10-CM

## 2024-11-29 RX ORDER — VALACYCLOVIR HYDROCHLORIDE 500 MG/1
500 TABLET, FILM COATED ORAL DAILY
Qty: 90 TABLET | OUTPATIENT
Start: 2024-11-29

## 2025-01-05 DIAGNOSIS — Z86.19 HISTORY OF COLD SORES: ICD-10-CM

## 2025-01-07 RX ORDER — VALACYCLOVIR HYDROCHLORIDE 500 MG/1
500 TABLET, FILM COATED ORAL DAILY
Qty: 30 TABLET | Refills: 2 | OUTPATIENT
Start: 2025-01-07 | End: 2025-04-07

## 2025-01-18 DIAGNOSIS — F41.9 ANXIETY AND DEPRESSION: ICD-10-CM

## 2025-01-18 DIAGNOSIS — F32.A ANXIETY AND DEPRESSION: ICD-10-CM

## 2025-01-24 DIAGNOSIS — Z86.19 HISTORY OF COLD SORES: ICD-10-CM

## 2025-01-27 DIAGNOSIS — Z86.19 HISTORY OF COLD SORES: ICD-10-CM

## 2025-01-27 RX ORDER — VALACYCLOVIR HYDROCHLORIDE 500 MG/1
500 TABLET, FILM COATED ORAL DAILY
Qty: 30 TABLET | Refills: 2 | OUTPATIENT
Start: 2025-01-27 | End: 2025-04-27

## 2025-01-27 RX ORDER — VALACYCLOVIR HYDROCHLORIDE 500 MG/1
500 TABLET, FILM COATED ORAL DAILY
Qty: 90 TABLET | Refills: 0 | Status: SHIPPED | OUTPATIENT
Start: 2025-01-27

## 2025-01-27 NOTE — TELEPHONE ENCOUNTER
Requested Prescriptions     Pending Prescriptions Disp Refills    valACYclovir (VALTREX) 500 MG tablet [Pharmacy Med Name: VALACYCLOVIR  MG TABLET] 90 tablet      Sig: TAKE 1 TABLET BY MOUTH EVERY DAY        Last Visit 7/1/24  Last Refill 11/4/24

## 2025-02-25 DIAGNOSIS — Z86.19 HISTORY OF COLD SORES: ICD-10-CM

## 2025-02-25 RX ORDER — VALACYCLOVIR HYDROCHLORIDE 500 MG/1
500 TABLET, FILM COATED ORAL 2 TIMES DAILY
Qty: 30 TABLET | Refills: 5 | OUTPATIENT
Start: 2025-02-25

## 2025-03-27 DIAGNOSIS — Z86.19 HISTORY OF COLD SORES: ICD-10-CM

## 2025-03-27 RX ORDER — VALACYCLOVIR HYDROCHLORIDE 500 MG/1
500 TABLET, FILM COATED ORAL DAILY
Qty: 90 TABLET | Refills: 0 | Status: SHIPPED | OUTPATIENT
Start: 2025-03-27

## 2025-05-18 DIAGNOSIS — Z86.19 HISTORY OF COLD SORES: ICD-10-CM

## 2025-05-19 RX ORDER — VALACYCLOVIR HYDROCHLORIDE 500 MG/1
500 TABLET, FILM COATED ORAL DAILY
Qty: 90 TABLET | Refills: 0 | Status: SHIPPED | OUTPATIENT
Start: 2025-05-19

## 2025-07-06 DIAGNOSIS — Z86.19 HISTORY OF COLD SORES: ICD-10-CM

## 2025-07-06 RX ORDER — VALACYCLOVIR HYDROCHLORIDE 500 MG/1
500 TABLET, FILM COATED ORAL DAILY
Qty: 90 TABLET | Refills: 1 | Status: SHIPPED | OUTPATIENT
Start: 2025-07-06

## 2025-07-27 DIAGNOSIS — F41.9 ANXIETY AND DEPRESSION: ICD-10-CM

## 2025-07-27 DIAGNOSIS — F32.A ANXIETY AND DEPRESSION: ICD-10-CM

## 2025-08-20 DIAGNOSIS — F41.9 ANXIETY AND DEPRESSION: ICD-10-CM

## 2025-08-20 DIAGNOSIS — F32.A ANXIETY AND DEPRESSION: ICD-10-CM

## 2025-08-24 DIAGNOSIS — Z78.9 OTHER SPECIFIED HEALTH STATUS: ICD-10-CM

## 2025-08-24 DIAGNOSIS — Z86.19 HISTORY OF COLD SORES: ICD-10-CM

## 2025-08-24 DIAGNOSIS — F41.9 ANXIETY AND DEPRESSION: ICD-10-CM

## 2025-08-24 DIAGNOSIS — F32.A ANXIETY AND DEPRESSION: ICD-10-CM

## 2025-08-24 RX ORDER — VALACYCLOVIR HYDROCHLORIDE 500 MG/1
500 TABLET, FILM COATED ORAL DAILY
Qty: 30 TABLET | Refills: 0 | Status: SHIPPED | OUTPATIENT
Start: 2025-08-24

## 2025-08-25 RX ORDER — NORGESTIMATE AND ETHINYL ESTRADIOL 7DAYSX3 LO
1 KIT ORAL DAILY
Qty: 28 TABLET | Refills: 4 | Status: SHIPPED | OUTPATIENT
Start: 2025-08-25

## 2025-08-25 RX ORDER — NORGESTIMATE AND ETHINYL ESTRADIOL 7DAYSX3 LO
1 KIT ORAL DAILY
Qty: 84 TABLET | Refills: 4 | OUTPATIENT
Start: 2025-08-25